# Patient Record
Sex: FEMALE | Race: WHITE | NOT HISPANIC OR LATINO
[De-identification: names, ages, dates, MRNs, and addresses within clinical notes are randomized per-mention and may not be internally consistent; named-entity substitution may affect disease eponyms.]

---

## 2017-05-02 ENCOUNTER — APPOINTMENT (OUTPATIENT)
Dept: ORTHOPEDIC SURGERY | Facility: CLINIC | Age: 57
End: 2017-05-02

## 2017-05-02 VITALS
DIASTOLIC BLOOD PRESSURE: 83 MMHG | HEART RATE: 96 BPM | BODY MASS INDEX: 31.5 KG/M2 | WEIGHT: 169 LBS | HEIGHT: 61.5 IN | SYSTOLIC BLOOD PRESSURE: 129 MMHG

## 2017-05-02 DIAGNOSIS — M16.12 UNILATERAL PRIMARY OSTEOARTHRITIS, LEFT HIP: ICD-10-CM

## 2017-05-08 ENCOUNTER — OUTPATIENT (OUTPATIENT)
Dept: OUTPATIENT SERVICES | Facility: HOSPITAL | Age: 57
LOS: 1 days | End: 2017-05-08
Payer: COMMERCIAL

## 2017-05-08 VITALS
WEIGHT: 173.5 LBS | SYSTOLIC BLOOD PRESSURE: 128 MMHG | TEMPERATURE: 98 F | OXYGEN SATURATION: 98 % | HEIGHT: 61.5 IN | RESPIRATION RATE: 15 BRPM | HEART RATE: 89 BPM | DIASTOLIC BLOOD PRESSURE: 91 MMHG

## 2017-05-08 DIAGNOSIS — G47.33 OBSTRUCTIVE SLEEP APNEA (ADULT) (PEDIATRIC): ICD-10-CM

## 2017-05-08 DIAGNOSIS — G56.01 CARPAL TUNNEL SYNDROME, RIGHT UPPER LIMB: Chronic | ICD-10-CM

## 2017-05-08 DIAGNOSIS — M16.12 UNILATERAL PRIMARY OSTEOARTHRITIS, LEFT HIP: ICD-10-CM

## 2017-05-08 DIAGNOSIS — Z01.818 ENCOUNTER FOR OTHER PREPROCEDURAL EXAMINATION: ICD-10-CM

## 2017-05-08 DIAGNOSIS — M19.90 UNSPECIFIED OSTEOARTHRITIS, UNSPECIFIED SITE: ICD-10-CM

## 2017-05-08 DIAGNOSIS — K60.3 ANAL FISTULA: Chronic | ICD-10-CM

## 2017-05-08 LAB
ANION GAP SERPL CALC-SCNC: 18 MMOL/L — HIGH (ref 5–17)
BLD GP AB SCN SERPL QL: NEGATIVE — SIGNIFICANT CHANGE UP
BUN SERPL-MCNC: 14 MG/DL — SIGNIFICANT CHANGE UP (ref 7–23)
CALCIUM SERPL-MCNC: 10.3 MG/DL — SIGNIFICANT CHANGE UP (ref 8.4–10.5)
CHLORIDE SERPL-SCNC: 99 MMOL/L — SIGNIFICANT CHANGE UP (ref 96–108)
CO2 SERPL-SCNC: 23 MMOL/L — SIGNIFICANT CHANGE UP (ref 22–31)
CREAT SERPL-MCNC: 0.8 MG/DL — SIGNIFICANT CHANGE UP (ref 0.5–1.3)
GLUCOSE SERPL-MCNC: 71 MG/DL — SIGNIFICANT CHANGE UP (ref 70–99)
HCT VFR BLD CALC: 41.7 % — SIGNIFICANT CHANGE UP (ref 34.5–45)
HGB BLD-MCNC: 14.8 G/DL — SIGNIFICANT CHANGE UP (ref 11.5–15.5)
MCHC RBC-ENTMCNC: 31.4 PG — SIGNIFICANT CHANGE UP (ref 27–34)
MCHC RBC-ENTMCNC: 35.5 GM/DL — SIGNIFICANT CHANGE UP (ref 32–36)
MCV RBC AUTO: 88.5 FL — SIGNIFICANT CHANGE UP (ref 80–100)
PLATELET # BLD AUTO: 275 K/UL — SIGNIFICANT CHANGE UP (ref 150–400)
POTASSIUM SERPL-MCNC: 4.4 MMOL/L — SIGNIFICANT CHANGE UP (ref 3.5–5.3)
POTASSIUM SERPL-SCNC: 4.4 MMOL/L — SIGNIFICANT CHANGE UP (ref 3.5–5.3)
RBC # BLD: 4.71 M/UL — SIGNIFICANT CHANGE UP (ref 3.8–5.2)
RBC # FLD: 12.4 % — SIGNIFICANT CHANGE UP (ref 10.3–14.5)
RH IG SCN BLD-IMP: POSITIVE — SIGNIFICANT CHANGE UP
SODIUM SERPL-SCNC: 140 MMOL/L — SIGNIFICANT CHANGE UP (ref 135–145)
WBC # BLD: 9.94 K/UL — SIGNIFICANT CHANGE UP (ref 3.8–10.5)
WBC # FLD AUTO: 9.94 K/UL — SIGNIFICANT CHANGE UP (ref 3.8–10.5)

## 2017-05-08 PROCEDURE — 87641 MR-STAPH DNA AMP PROBE: CPT

## 2017-05-08 PROCEDURE — 86900 BLOOD TYPING SEROLOGIC ABO: CPT

## 2017-05-08 PROCEDURE — 85027 COMPLETE CBC AUTOMATED: CPT

## 2017-05-08 PROCEDURE — 86901 BLOOD TYPING SEROLOGIC RH(D): CPT

## 2017-05-08 PROCEDURE — G0463: CPT

## 2017-05-08 PROCEDURE — 80048 BASIC METABOLIC PNL TOTAL CA: CPT

## 2017-05-08 PROCEDURE — 87640 STAPH A DNA AMP PROBE: CPT

## 2017-05-08 PROCEDURE — 86850 RBC ANTIBODY SCREEN: CPT

## 2017-05-08 RX ORDER — GABAPENTIN 400 MG/1
300 CAPSULE ORAL ONCE
Qty: 0 | Refills: 0 | Status: COMPLETED | OUTPATIENT
Start: 2017-05-15 | End: 2017-05-15

## 2017-05-08 RX ORDER — VANCOMYCIN HCL 1 G
1000 VIAL (EA) INTRAVENOUS ONCE
Qty: 0 | Refills: 0 | Status: DISCONTINUED | OUTPATIENT
Start: 2017-05-15 | End: 2017-05-16

## 2017-05-08 RX ORDER — ACETAMINOPHEN 500 MG
975 TABLET ORAL ONCE
Qty: 0 | Refills: 0 | Status: COMPLETED | OUTPATIENT
Start: 2017-05-15 | End: 2017-05-15

## 2017-05-08 NOTE — H&P PST ADULT - PSH
Carpal tunnel syndrome of right wrist  1996 Anal fistula  3/2017 - s/p fistula repair  Carpal tunnel syndrome of right wrist  1996

## 2017-05-08 NOTE — H&P PST ADULT - MUSCULOSKELETAL
details… detailed exam decreased ROM due to pain - left hip/normal strength/no calf tenderness/decreased ROM due to pain

## 2017-05-08 NOTE — H&P PST ADULT - NSANTHOSAYNRD_GEN_A_CORE
No. SANDY screening performed.  STOP BANG Legend: 0-2 = LOW Risk; 3-4 = INTERMEDIATE Risk; 5-8 = HIGH Risk

## 2017-05-08 NOTE — H&P PST ADULT - HISTORY OF PRESENT ILLNESS
57 yo female with h/o HTN, HLD, anxiety, depression and osteoarthrosis with c/o worsening left groin pain presents for left THR on 5/15/2017.

## 2017-05-08 NOTE — H&P PST ADULT - PROBLEM SELECTOR PLAN 1
Left THR  Preemptive analgesia preoperatively - pt states she will take own PPI (omeprazole) and tramadol at home prior to arriving at hospital on day of surgery.

## 2017-05-08 NOTE — H&P PST ADULT - PMH
Anxiety    Depression    Diverticulitis  2015  GERD (gastroesophageal reflux disease)    Hyperlipidemia    Hypertension    Osteoarthrosis Anal fistula  past - s/p fistulotomy  Anxiety    Depression    Diverticulitis  2015  GERD (gastroesophageal reflux disease)    Hyperlipidemia    Hypertension    Osteoarthrosis

## 2017-05-08 NOTE — H&P PST ADULT - DENTITION
denies dentures, right lower/normal denies dentures; right lower - slightly loose tooth, saw dentist recently, no further action required/normal

## 2017-05-09 LAB
MRSA PCR RESULT.: SIGNIFICANT CHANGE UP
S AUREUS DNA NOSE QL NAA+PROBE: DETECTED

## 2017-05-15 ENCOUNTER — TRANSCRIPTION ENCOUNTER (OUTPATIENT)
Age: 57
End: 2017-05-15

## 2017-05-15 ENCOUNTER — INPATIENT (INPATIENT)
Facility: HOSPITAL | Age: 57
LOS: 0 days | Discharge: HOME | DRG: 470 | End: 2017-05-16
Attending: ORTHOPAEDIC SURGERY | Admitting: ORTHOPAEDIC SURGERY
Payer: COMMERCIAL

## 2017-05-15 ENCOUNTER — RESULT REVIEW (OUTPATIENT)
Age: 57
End: 2017-05-15

## 2017-05-15 ENCOUNTER — APPOINTMENT (OUTPATIENT)
Dept: ORTHOPEDIC SURGERY | Facility: HOSPITAL | Age: 57
End: 2017-05-15

## 2017-05-15 VITALS
RESPIRATION RATE: 20 BRPM | WEIGHT: 169.98 LBS | DIASTOLIC BLOOD PRESSURE: 95 MMHG | OXYGEN SATURATION: 100 % | SYSTOLIC BLOOD PRESSURE: 136 MMHG | TEMPERATURE: 99 F | HEIGHT: 61.5 IN | HEART RATE: 94 BPM

## 2017-05-15 DIAGNOSIS — K60.3 ANAL FISTULA: Chronic | ICD-10-CM

## 2017-05-15 DIAGNOSIS — M16.12 UNILATERAL PRIMARY OSTEOARTHRITIS, LEFT HIP: ICD-10-CM

## 2017-05-15 DIAGNOSIS — G56.01 CARPAL TUNNEL SYNDROME, RIGHT UPPER LIMB: Chronic | ICD-10-CM

## 2017-05-15 LAB
BUN SERPL-MCNC: 13 MG/DL — SIGNIFICANT CHANGE UP (ref 7–23)
CALCIUM SERPL-MCNC: 8.6 MG/DL — SIGNIFICANT CHANGE UP (ref 8.4–10.5)
CHLORIDE SERPL-SCNC: 102 MMOL/L — SIGNIFICANT CHANGE UP (ref 96–108)
CO2 SERPL-SCNC: 26 MMOL/L — SIGNIFICANT CHANGE UP (ref 22–31)
CREAT SERPL-MCNC: 0.77 MG/DL — SIGNIFICANT CHANGE UP (ref 0.5–1.3)
GLUCOSE SERPL-MCNC: 146 MG/DL — HIGH (ref 70–99)
HCG UR QL: NEGATIVE — SIGNIFICANT CHANGE UP
HCT VFR BLD CALC: 36.9 % — SIGNIFICANT CHANGE UP (ref 34.5–45)
HGB BLD-MCNC: 12.9 G/DL — SIGNIFICANT CHANGE UP (ref 11.5–15.5)
MCHC RBC-ENTMCNC: 32.2 PG — SIGNIFICANT CHANGE UP (ref 27–34)
MCHC RBC-ENTMCNC: 34.9 GM/DL — SIGNIFICANT CHANGE UP (ref 32–36)
MCV RBC AUTO: 92.1 FL — SIGNIFICANT CHANGE UP (ref 80–100)
PLATELET # BLD AUTO: 267 K/UL — SIGNIFICANT CHANGE UP (ref 150–400)
POTASSIUM SERPL-MCNC: 4.4 MMOL/L — SIGNIFICANT CHANGE UP (ref 3.5–5.3)
POTASSIUM SERPL-SCNC: 4.4 MMOL/L — SIGNIFICANT CHANGE UP (ref 3.5–5.3)
RBC # BLD: 4.01 M/UL — SIGNIFICANT CHANGE UP (ref 3.8–5.2)
RBC # FLD: 11.1 % — SIGNIFICANT CHANGE UP (ref 10.3–14.5)
SODIUM SERPL-SCNC: 140 MMOL/L — SIGNIFICANT CHANGE UP (ref 135–145)
WBC # BLD: 15.8 K/UL — HIGH (ref 3.8–10.5)
WBC # FLD AUTO: 15.8 K/UL — HIGH (ref 3.8–10.5)

## 2017-05-15 PROCEDURE — 88305 TISSUE EXAM BY PATHOLOGIST: CPT | Mod: 26

## 2017-05-15 PROCEDURE — 88311 DECALCIFY TISSUE: CPT | Mod: 26

## 2017-05-15 PROCEDURE — 27130 TOTAL HIP ARTHROPLASTY: CPT | Mod: LT

## 2017-05-15 PROCEDURE — 73502 X-RAY EXAM HIP UNI 2-3 VIEWS: CPT | Mod: 26,LT

## 2017-05-15 RX ORDER — ALPRAZOLAM 0.25 MG
1 TABLET ORAL
Qty: 0 | Refills: 0 | COMMUNITY

## 2017-05-15 RX ORDER — ACETAMINOPHEN 500 MG
650 TABLET ORAL EVERY 6 HOURS
Qty: 0 | Refills: 0 | Status: DISCONTINUED | OUTPATIENT
Start: 2017-05-15 | End: 2017-05-16

## 2017-05-15 RX ORDER — ONDANSETRON 8 MG/1
4 TABLET, FILM COATED ORAL ONCE
Qty: 0 | Refills: 0 | Status: DISCONTINUED | OUTPATIENT
Start: 2017-05-15 | End: 2017-05-15

## 2017-05-15 RX ORDER — FLUOXETINE HCL 10 MG
1 CAPSULE ORAL
Qty: 0 | Refills: 0 | COMMUNITY

## 2017-05-15 RX ORDER — OXYCODONE HYDROCHLORIDE 5 MG/1
10 TABLET ORAL EVERY 4 HOURS
Qty: 0 | Refills: 0 | Status: DISCONTINUED | OUTPATIENT
Start: 2017-05-15 | End: 2017-05-16

## 2017-05-15 RX ORDER — ACETAMINOPHEN 500 MG
975 TABLET ORAL EVERY 6 HOURS
Qty: 0 | Refills: 0 | Status: DISCONTINUED | OUTPATIENT
Start: 2017-05-15 | End: 2017-05-16

## 2017-05-15 RX ORDER — DOCUSATE SODIUM 100 MG
100 CAPSULE ORAL THREE TIMES A DAY
Qty: 0 | Refills: 0 | Status: DISCONTINUED | OUTPATIENT
Start: 2017-05-15 | End: 2017-05-16

## 2017-05-15 RX ORDER — SODIUM CHLORIDE 9 MG/ML
1000 INJECTION INTRAMUSCULAR; INTRAVENOUS; SUBCUTANEOUS ONCE
Qty: 0 | Refills: 0 | Status: COMPLETED | OUTPATIENT
Start: 2017-05-16 | End: 2017-05-16

## 2017-05-15 RX ORDER — KETOROLAC TROMETHAMINE 30 MG/ML
15 SYRINGE (ML) INJECTION EVERY 8 HOURS
Qty: 0 | Refills: 0 | Status: DISCONTINUED | OUTPATIENT
Start: 2017-05-16 | End: 2017-05-16

## 2017-05-15 RX ORDER — ATORVASTATIN CALCIUM 80 MG/1
1 TABLET, FILM COATED ORAL
Qty: 0 | Refills: 0 | COMMUNITY

## 2017-05-15 RX ORDER — POLYETHYLENE GLYCOL 3350 17 G/17G
17 POWDER, FOR SOLUTION ORAL DAILY
Qty: 0 | Refills: 0 | Status: DISCONTINUED | OUTPATIENT
Start: 2017-05-15 | End: 2017-05-16

## 2017-05-15 RX ORDER — CEFAZOLIN SODIUM 1 G
2000 VIAL (EA) INJECTION EVERY 8 HOURS
Qty: 0 | Refills: 0 | Status: COMPLETED | OUTPATIENT
Start: 2017-05-16 | End: 2017-05-16

## 2017-05-15 RX ORDER — LOSARTAN POTASSIUM 100 MG/1
25 TABLET, FILM COATED ORAL DAILY
Qty: 0 | Refills: 0 | Status: DISCONTINUED | OUTPATIENT
Start: 2017-05-15 | End: 2017-05-16

## 2017-05-15 RX ORDER — SODIUM CHLORIDE 9 MG/ML
3 INJECTION INTRAMUSCULAR; INTRAVENOUS; SUBCUTANEOUS EVERY 8 HOURS
Qty: 0 | Refills: 0 | Status: DISCONTINUED | OUTPATIENT
Start: 2017-05-15 | End: 2017-05-15

## 2017-05-15 RX ORDER — OMEPRAZOLE 10 MG/1
1 CAPSULE, DELAYED RELEASE ORAL
Qty: 0 | Refills: 0 | COMMUNITY

## 2017-05-15 RX ORDER — GABAPENTIN 400 MG/1
100 CAPSULE ORAL EVERY 8 HOURS
Qty: 0 | Refills: 0 | Status: DISCONTINUED | OUTPATIENT
Start: 2017-05-15 | End: 2017-05-16

## 2017-05-15 RX ORDER — SODIUM CHLORIDE 9 MG/ML
1000 INJECTION INTRAMUSCULAR; INTRAVENOUS; SUBCUTANEOUS ONCE
Qty: 0 | Refills: 0 | Status: COMPLETED | OUTPATIENT
Start: 2017-05-15 | End: 2017-05-16

## 2017-05-15 RX ORDER — HYDROMORPHONE HYDROCHLORIDE 2 MG/ML
0.5 INJECTION INTRAMUSCULAR; INTRAVENOUS; SUBCUTANEOUS
Qty: 0 | Refills: 0 | Status: DISCONTINUED | OUTPATIENT
Start: 2017-05-15 | End: 2017-05-15

## 2017-05-15 RX ORDER — OXYCODONE HYDROCHLORIDE 5 MG/1
5 TABLET ORAL EVERY 4 HOURS
Qty: 0 | Refills: 0 | Status: DISCONTINUED | OUTPATIENT
Start: 2017-05-15 | End: 2017-05-16

## 2017-05-15 RX ORDER — MORPHINE SULFATE 50 MG/1
2 CAPSULE, EXTENDED RELEASE ORAL
Qty: 0 | Refills: 0 | Status: DISCONTINUED | OUTPATIENT
Start: 2017-05-15 | End: 2017-05-16

## 2017-05-15 RX ORDER — SODIUM CHLORIDE 9 MG/ML
1000 INJECTION, SOLUTION INTRAVENOUS
Qty: 0 | Refills: 0 | Status: DISCONTINUED | OUTPATIENT
Start: 2017-05-15 | End: 2017-05-16

## 2017-05-15 RX ORDER — LOSARTAN POTASSIUM 100 MG/1
1 TABLET, FILM COATED ORAL
Qty: 0 | Refills: 0 | COMMUNITY

## 2017-05-15 RX ORDER — PANTOPRAZOLE SODIUM 20 MG/1
40 TABLET, DELAYED RELEASE ORAL
Qty: 0 | Refills: 0 | Status: DISCONTINUED | OUTPATIENT
Start: 2017-05-15 | End: 2017-05-16

## 2017-05-15 RX ORDER — DIPHENHYDRAMINE HCL 50 MG
25 CAPSULE ORAL EVERY 8 HOURS
Qty: 0 | Refills: 0 | Status: DISCONTINUED | OUTPATIENT
Start: 2017-05-15 | End: 2017-05-16

## 2017-05-15 RX ORDER — MAGNESIUM HYDROXIDE 400 MG/1
30 TABLET, CHEWABLE ORAL DAILY
Qty: 0 | Refills: 0 | Status: DISCONTINUED | OUTPATIENT
Start: 2017-05-15 | End: 2017-05-16

## 2017-05-15 RX ORDER — ASPIRIN/CALCIUM CARB/MAGNESIUM 324 MG
325 TABLET ORAL
Qty: 0 | Refills: 0 | Status: DISCONTINUED | OUTPATIENT
Start: 2017-05-15 | End: 2017-05-16

## 2017-05-15 RX ORDER — ONDANSETRON 8 MG/1
4 TABLET, FILM COATED ORAL EVERY 6 HOURS
Qty: 0 | Refills: 0 | Status: DISCONTINUED | OUTPATIENT
Start: 2017-05-15 | End: 2017-05-16

## 2017-05-15 RX ORDER — ATORVASTATIN CALCIUM 80 MG/1
10 TABLET, FILM COATED ORAL AT BEDTIME
Qty: 0 | Refills: 0 | Status: DISCONTINUED | OUTPATIENT
Start: 2017-05-15 | End: 2017-05-16

## 2017-05-15 RX ORDER — FLUOXETINE HCL 10 MG
40 CAPSULE ORAL DAILY
Qty: 0 | Refills: 0 | Status: DISCONTINUED | OUTPATIENT
Start: 2017-05-15 | End: 2017-05-16

## 2017-05-15 RX ORDER — SENNA PLUS 8.6 MG/1
2 TABLET ORAL AT BEDTIME
Qty: 0 | Refills: 0 | Status: DISCONTINUED | OUTPATIENT
Start: 2017-05-15 | End: 2017-05-16

## 2017-05-15 RX ORDER — ALPRAZOLAM 0.25 MG
1 TABLET ORAL
Qty: 0 | Refills: 0 | Status: DISCONTINUED | OUTPATIENT
Start: 2017-05-15 | End: 2017-05-16

## 2017-05-15 RX ORDER — TRAMADOL HYDROCHLORIDE 50 MG/1
50 TABLET ORAL EVERY 8 HOURS
Qty: 0 | Refills: 0 | Status: DISCONTINUED | OUTPATIENT
Start: 2017-05-15 | End: 2017-05-16

## 2017-05-15 RX ADMIN — SODIUM CHLORIDE 3 MILLILITER(S): 9 INJECTION INTRAMUSCULAR; INTRAVENOUS; SUBCUTANEOUS at 12:34

## 2017-05-15 RX ADMIN — HYDROMORPHONE HYDROCHLORIDE 0.5 MILLIGRAM(S): 2 INJECTION INTRAMUSCULAR; INTRAVENOUS; SUBCUTANEOUS at 18:31

## 2017-05-15 RX ADMIN — Medication 975 MILLIGRAM(S): at 12:34

## 2017-05-15 RX ADMIN — GABAPENTIN 100 MILLIGRAM(S): 400 CAPSULE ORAL at 23:04

## 2017-05-15 RX ADMIN — Medication 1 MILLIGRAM(S): at 22:02

## 2017-05-15 RX ADMIN — HYDROMORPHONE HYDROCHLORIDE 0.5 MILLIGRAM(S): 2 INJECTION INTRAMUSCULAR; INTRAVENOUS; SUBCUTANEOUS at 16:45

## 2017-05-15 RX ADMIN — OXYCODONE HYDROCHLORIDE 10 MILLIGRAM(S): 5 TABLET ORAL at 19:45

## 2017-05-15 RX ADMIN — TRAMADOL HYDROCHLORIDE 50 MILLIGRAM(S): 50 TABLET ORAL at 23:34

## 2017-05-15 RX ADMIN — ATORVASTATIN CALCIUM 10 MILLIGRAM(S): 80 TABLET, FILM COATED ORAL at 21:55

## 2017-05-15 RX ADMIN — Medication 100 MILLIGRAM(S): at 21:56

## 2017-05-15 RX ADMIN — OXYCODONE HYDROCHLORIDE 10 MILLIGRAM(S): 5 TABLET ORAL at 20:05

## 2017-05-15 RX ADMIN — TRAMADOL HYDROCHLORIDE 50 MILLIGRAM(S): 50 TABLET ORAL at 23:04

## 2017-05-15 RX ADMIN — GABAPENTIN 300 MILLIGRAM(S): 400 CAPSULE ORAL at 12:34

## 2017-05-15 RX ADMIN — MORPHINE SULFATE 2 MILLIGRAM(S): 50 CAPSULE, EXTENDED RELEASE ORAL at 23:45

## 2017-05-15 RX ADMIN — HYDROMORPHONE HYDROCHLORIDE 0.5 MILLIGRAM(S): 2 INJECTION INTRAMUSCULAR; INTRAVENOUS; SUBCUTANEOUS at 16:57

## 2017-05-15 RX ADMIN — Medication 325 MILLIGRAM(S): at 18:30

## 2017-05-15 NOTE — PATIENT PROFILE ADULT. - PMH
Anal fistula  past - s/p fistulotomy  Anxiety    Depression    Diverticulitis  2015  GERD (gastroesophageal reflux disease)    Hyperlipidemia    Hypertension    Osteoarthrosis

## 2017-05-16 ENCOUNTER — TRANSCRIPTION ENCOUNTER (OUTPATIENT)
Age: 57
End: 2017-05-16

## 2017-05-16 VITALS
DIASTOLIC BLOOD PRESSURE: 82 MMHG | TEMPERATURE: 98 F | RESPIRATION RATE: 18 BRPM | OXYGEN SATURATION: 97 % | HEART RATE: 103 BPM | SYSTOLIC BLOOD PRESSURE: 132 MMHG

## 2017-05-16 LAB
ANION GAP SERPL CALC-SCNC: 16 MMOL/L — SIGNIFICANT CHANGE UP (ref 5–17)
BUN SERPL-MCNC: 9 MG/DL — SIGNIFICANT CHANGE UP (ref 7–23)
CALCIUM SERPL-MCNC: 9.3 MG/DL — SIGNIFICANT CHANGE UP (ref 8.4–10.5)
CHLORIDE SERPL-SCNC: 101 MMOL/L — SIGNIFICANT CHANGE UP (ref 96–108)
CO2 SERPL-SCNC: 24 MMOL/L — SIGNIFICANT CHANGE UP (ref 22–31)
CREAT SERPL-MCNC: 0.66 MG/DL — SIGNIFICANT CHANGE UP (ref 0.5–1.3)
GLUCOSE SERPL-MCNC: 115 MG/DL — HIGH (ref 70–99)
HCT VFR BLD CALC: 33.4 % — LOW (ref 34.5–45)
HGB BLD-MCNC: 11.7 G/DL — SIGNIFICANT CHANGE UP (ref 11.5–15.5)
MCHC RBC-ENTMCNC: 32.2 PG — SIGNIFICANT CHANGE UP (ref 27–34)
MCHC RBC-ENTMCNC: 34.9 GM/DL — SIGNIFICANT CHANGE UP (ref 32–36)
MCV RBC AUTO: 92.1 FL — SIGNIFICANT CHANGE UP (ref 80–100)
PLATELET # BLD AUTO: 264 K/UL — SIGNIFICANT CHANGE UP (ref 150–400)
POTASSIUM SERPL-MCNC: 4.7 MMOL/L — SIGNIFICANT CHANGE UP (ref 3.5–5.3)
POTASSIUM SERPL-SCNC: 4.7 MMOL/L — SIGNIFICANT CHANGE UP (ref 3.5–5.3)
RBC # BLD: 3.63 M/UL — LOW (ref 3.8–5.2)
RBC # FLD: 11.3 % — SIGNIFICANT CHANGE UP (ref 10.3–14.5)
SODIUM SERPL-SCNC: 141 MMOL/L — SIGNIFICANT CHANGE UP (ref 135–145)
WBC # BLD: 15.7 K/UL — HIGH (ref 3.8–10.5)
WBC # FLD AUTO: 15.7 K/UL — HIGH (ref 3.8–10.5)

## 2017-05-16 PROCEDURE — C1889: CPT

## 2017-05-16 PROCEDURE — 97116 GAIT TRAINING THERAPY: CPT

## 2017-05-16 PROCEDURE — 88305 TISSUE EXAM BY PATHOLOGIST: CPT

## 2017-05-16 PROCEDURE — 97161 PT EVAL LOW COMPLEX 20 MIN: CPT

## 2017-05-16 PROCEDURE — 72170 X-RAY EXAM OF PELVIS: CPT

## 2017-05-16 PROCEDURE — 88311 DECALCIFY TISSUE: CPT

## 2017-05-16 PROCEDURE — 85027 COMPLETE CBC AUTOMATED: CPT

## 2017-05-16 PROCEDURE — 97165 OT EVAL LOW COMPLEX 30 MIN: CPT

## 2017-05-16 PROCEDURE — C1776: CPT

## 2017-05-16 PROCEDURE — 81025 URINE PREGNANCY TEST: CPT

## 2017-05-16 PROCEDURE — 80048 BASIC METABOLIC PNL TOTAL CA: CPT

## 2017-05-16 PROCEDURE — C1713: CPT

## 2017-05-16 PROCEDURE — 73501 X-RAY EXAM HIP UNI 1 VIEW: CPT

## 2017-05-16 RX ORDER — LACTOBACILLUS ACIDOPHILUS 100MM CELL
1 CAPSULE ORAL
Qty: 0 | Refills: 0 | COMMUNITY

## 2017-05-16 RX ORDER — VITAMIN E 100 UNIT
1 CAPSULE ORAL
Qty: 0 | Refills: 0 | COMMUNITY

## 2017-05-16 RX ORDER — DICLOFENAC SODIUM 75 MG/1
50 TABLET, DELAYED RELEASE ORAL
Qty: 0 | Refills: 0 | Status: DISCONTINUED | OUTPATIENT
Start: 2017-05-16 | End: 2017-05-16

## 2017-05-16 RX ORDER — DIPHENHYDRAMINE HCL 50 MG
1 CAPSULE ORAL
Qty: 0 | Refills: 0 | COMMUNITY

## 2017-05-16 RX ORDER — SENNA PLUS 8.6 MG/1
2 TABLET ORAL
Qty: 0 | Refills: 0 | COMMUNITY
Start: 2017-05-16

## 2017-05-16 RX ORDER — CHOLECALCIFEROL (VITAMIN D3) 125 MCG
1 CAPSULE ORAL
Qty: 0 | Refills: 0 | COMMUNITY

## 2017-05-16 RX ORDER — ACETAMINOPHEN 500 MG
1000 TABLET ORAL ONCE
Qty: 0 | Refills: 0 | Status: COMPLETED | OUTPATIENT
Start: 2017-05-16 | End: 2017-05-16

## 2017-05-16 RX ORDER — TRAMADOL HYDROCHLORIDE 50 MG/1
1 TABLET ORAL
Qty: 21 | Refills: 0 | OUTPATIENT
Start: 2017-05-16 | End: 2017-05-23

## 2017-05-16 RX ORDER — ACETAMINOPHEN 500 MG
2 TABLET ORAL
Qty: 0 | Refills: 0 | COMMUNITY
Start: 2017-05-16

## 2017-05-16 RX ORDER — DOCUSATE SODIUM 100 MG
1 CAPSULE ORAL
Qty: 0 | Refills: 0 | COMMUNITY
Start: 2017-05-16

## 2017-05-16 RX ORDER — OXYCODONE HYDROCHLORIDE 5 MG/1
1 TABLET ORAL
Qty: 55 | Refills: 0 | OUTPATIENT
Start: 2017-05-16

## 2017-05-16 RX ORDER — TRAMADOL HYDROCHLORIDE 50 MG/1
1 TABLET ORAL
Qty: 0 | Refills: 0 | COMMUNITY

## 2017-05-16 RX ORDER — CALCIUM CARBONATE 500(1250)
1 TABLET ORAL
Qty: 0 | Refills: 0 | COMMUNITY

## 2017-05-16 RX ORDER — OMEGA-3 ACID ETHYL ESTERS 1 G
1 CAPSULE ORAL
Qty: 0 | Refills: 0 | COMMUNITY

## 2017-05-16 RX ORDER — DICLOFENAC SODIUM 75 MG/1
1 TABLET, DELAYED RELEASE ORAL
Qty: 0 | Refills: 0 | COMMUNITY

## 2017-05-16 RX ORDER — POLYETHYLENE GLYCOL 3350 17 G/17G
17 POWDER, FOR SOLUTION ORAL
Qty: 0 | Refills: 0 | COMMUNITY
Start: 2017-05-16

## 2017-05-16 RX ORDER — ASPIRIN/CALCIUM CARB/MAGNESIUM 324 MG
1 TABLET ORAL
Qty: 0 | Refills: 0 | COMMUNITY
Start: 2017-05-16

## 2017-05-16 RX ORDER — L.ACIDOPH/B.ANIMALIS/B.LONGUM 15B CELL
1 CAPSULE ORAL
Qty: 0 | Refills: 0 | COMMUNITY

## 2017-05-16 RX ADMIN — OXYCODONE HYDROCHLORIDE 10 MILLIGRAM(S): 5 TABLET ORAL at 12:21

## 2017-05-16 RX ADMIN — Medication 1 TABLET(S): at 12:21

## 2017-05-16 RX ADMIN — Medication 40 MILLIGRAM(S): at 12:21

## 2017-05-16 RX ADMIN — SODIUM CHLORIDE 1000 MILLILITER(S): 9 INJECTION INTRAMUSCULAR; INTRAVENOUS; SUBCUTANEOUS at 06:46

## 2017-05-16 RX ADMIN — MORPHINE SULFATE 2 MILLIGRAM(S): 50 CAPSULE, EXTENDED RELEASE ORAL at 04:50

## 2017-05-16 RX ADMIN — OXYCODONE HYDROCHLORIDE 10 MILLIGRAM(S): 5 TABLET ORAL at 01:28

## 2017-05-16 RX ADMIN — PANTOPRAZOLE SODIUM 40 MILLIGRAM(S): 20 TABLET, DELAYED RELEASE ORAL at 06:18

## 2017-05-16 RX ADMIN — MORPHINE SULFATE 2 MILLIGRAM(S): 50 CAPSULE, EXTENDED RELEASE ORAL at 00:15

## 2017-05-16 RX ADMIN — LOSARTAN POTASSIUM 25 MILLIGRAM(S): 100 TABLET, FILM COATED ORAL at 06:18

## 2017-05-16 RX ADMIN — Medication 1000 MILLIGRAM(S): at 03:32

## 2017-05-16 RX ADMIN — Medication 100 MILLIGRAM(S): at 08:12

## 2017-05-16 RX ADMIN — OXYCODONE HYDROCHLORIDE 10 MILLIGRAM(S): 5 TABLET ORAL at 06:49

## 2017-05-16 RX ADMIN — Medication 325 MILLIGRAM(S): at 06:18

## 2017-05-16 RX ADMIN — DICLOFENAC SODIUM 50 MILLIGRAM(S): 75 TABLET, DELAYED RELEASE ORAL at 03:01

## 2017-05-16 RX ADMIN — Medication 400 MILLIGRAM(S): at 03:02

## 2017-05-16 RX ADMIN — Medication 100 MILLIGRAM(S): at 02:28

## 2017-05-16 RX ADMIN — DICLOFENAC SODIUM 50 MILLIGRAM(S): 75 TABLET, DELAYED RELEASE ORAL at 03:31

## 2017-05-16 RX ADMIN — TRAMADOL HYDROCHLORIDE 50 MILLIGRAM(S): 50 TABLET ORAL at 16:14

## 2017-05-16 RX ADMIN — Medication 100 MILLIGRAM(S): at 06:20

## 2017-05-16 RX ADMIN — OXYCODONE HYDROCHLORIDE 10 MILLIGRAM(S): 5 TABLET ORAL at 06:19

## 2017-05-16 RX ADMIN — OXYCODONE HYDROCHLORIDE 10 MILLIGRAM(S): 5 TABLET ORAL at 10:34

## 2017-05-16 RX ADMIN — TRAMADOL HYDROCHLORIDE 50 MILLIGRAM(S): 50 TABLET ORAL at 12:28

## 2017-05-16 RX ADMIN — SODIUM CHLORIDE 1000 MILLILITER(S): 9 INJECTION INTRAMUSCULAR; INTRAVENOUS; SUBCUTANEOUS at 00:46

## 2017-05-16 RX ADMIN — GABAPENTIN 100 MILLIGRAM(S): 400 CAPSULE ORAL at 06:21

## 2017-05-16 RX ADMIN — MORPHINE SULFATE 2 MILLIGRAM(S): 50 CAPSULE, EXTENDED RELEASE ORAL at 04:20

## 2017-05-16 RX ADMIN — OXYCODONE HYDROCHLORIDE 10 MILLIGRAM(S): 5 TABLET ORAL at 00:58

## 2017-05-16 NOTE — OCCUPATIONAL THERAPY INITIAL EVALUATION ADULT - ANTICIPATED DISCHARGE DISPOSITION, OT EVAL
Home OT to address deficits, assess home safety, increase independence in ADLs and  functional mobility.

## 2017-05-16 NOTE — DISCHARGE NOTE ADULT - CARE PROVIDER_API CALL
Rajendra Malone), Orthopaedic Surgery  [address] [place] [address]  Phone: [phone]  Fax: [phone]

## 2017-05-16 NOTE — PHYSICAL THERAPY INITIAL EVALUATION ADULT - PERTINENT HX OF CURRENT PROBLEM, REHAB EVAL
Patient is a 56 yr old female with h/o HTN, HLD, anxiety, depression and osteoarthrosis who presents with c/o worsening left groin pain. Patient is now s/p above procedure on 5/15.

## 2017-05-16 NOTE — PHYSICAL THERAPY INITIAL EVALUATION ADULT - ACTIVE RANGE OF MOTION EXAMINATION, REHAB EVAL
L hip limited by pain, L knee/ankle WFL/Right LE Active ROM was WFL (within functional limits)/Right UE Active ROM was WFL (within functional limits)/Left UE Active ROM was WFL (within functional limits)

## 2017-05-16 NOTE — DISCHARGE NOTE ADULT - MEDICATION SUMMARY - MEDICATIONS TO TAKE
I will START or STAY ON the medications listed below when I get home from the hospital:    acetaminophen 325 mg oral tablet  -- 2 tab(s) by mouth every 6 hours, As needed, For Temp over 38.3 C (100.94 F)  -- Indication: For Pain / fever    oxyCODONE 5 mg oral tablet  -- 1-2 tab(s) by mouth every 6 hours, As needed, MDD:6  -- Indication: For severe pain    traMADol 50 mg oral tablet  -- 1 tab(s) by mouth every 8 hours MDD:3  -- Indication: For Pain    aspirin 325 mg oral delayed release tablet  -- 1 tab(s) by mouth 2 times a day x 6 WEEKS Total (blood thinner) then STOP   -- Indication: For Blood thinner    losartan 25 mg oral tablet  -- 1 tab(s) by mouth once a day  -- Indication: For Hypertension    FLUoxetine 40 mg oral capsule  -- 1 cap(s) by mouth once a day  -- Indication: For Anxiety    atorvastatin 10 mg oral tablet  -- 1 tab(s) by mouth once a day  -- Indication: For Hyperlipidemia    ALPRAZolam 1 mg oral tablet  -- 1 tab(s) by mouth 2 times a day, As Needed  -- Indication: For Anxiety    senna oral tablet  -- 2 tab(s) by mouth once a day (at bedtime), As needed, Constipation  -- Indication: For laxative    polyethylene glycol 3350 oral powder for reconstitution  -- 17 gram(s) by mouth once a day  -- while on pain medications   -- Indication: For laxative    docusate sodium 100 mg oral capsule  -- 1 cap(s) by mouth 3 times a day  -- while on pain medications   -- Indication: For stool softener    omeprazole 40 mg oral delayed release capsule  -- 1 cap(s) by mouth once a day  -- Indication: For reflux    Multiple Vitamins oral tablet  -- 1 tab(s) by mouth once a day  -- Indication: For supplement

## 2017-05-16 NOTE — DISCHARGE NOTE ADULT - HOSPITAL COURSE
Chief Complaint/Reason for Admission	"I am having a left hip replacement."	    History of Present Illness:  History of Present Illness		  57 yo female with h/o HTN, HLD, anxiety, depression and osteoarthrosis with c/o worsening left groin pain presents for left THR on 5/15/2017.    Allergies/Medications:   Allergies:        Intolerances:  	amoxicillin: Drug, Other, C.Diff    Home Medications:   * Patient Currently Takes Medications as of 08-May-2017 14:06 documented in Prescription Writer  · 	losartan 25 mg oral tablet: Last Dose Taken:  , 1 tab(s) orally once a day  · 	atorvastatin 10 mg oral tablet: Last Dose Taken:  , 1 tab(s) orally once a day  · 	FLUoxetine 40 mg oral capsule: Last Dose Taken:  , 1 cap(s) orally once a day  · 	traMADol 50 mg oral tablet: Last Dose Taken:  , 1 tab(s) orally 1 to 2 times a day, As Needed  · 	diclofenac sodium 75 mg oral delayed release tablet: Last Dose Taken:  , 1 tab(s) orally 2 times a day  · 	ALPRAZolam 1 mg oral tablet: Last Dose Taken:  , 1 tab(s) orally 2 times a day, As Needed  · 	Fish Oil 1000 mg oral capsule: Last Dose Taken:  , 1 cap(s) orally 2 times a day  · 	calcium (as carbonate) 600 mg oral tablet: Last Dose Taken:  , 1 tab(s) orally 2 times a day  · 	Probiotic Formula oral capsule: Last Dose Taken:  , 1 cap(s) orally once a day  · 	Acidophilus oral capsule: Last Dose Taken:  , 1 cap(s) orally once a day  · 	magnesium: Last Dose Taken:  , 400 milligram(s) orally once a day  · 	vitamin E 400 intl units oral capsule: Last Dose Taken:  , 1 cap(s) orally once a day  · 	Vitamin D3 1000 intl units oral capsule: Last Dose Taken:  , 1 cap(s) orally once a day  · 	remifemin: Last Dose Taken:  , 1 tab(s) orally 2 times a day  · 	diphenhydrAMINE 25 mg oral tablet: Last Dose Taken:  , 1 tab(s) orally 3 times a day, As Needed  · 	histamine dihydrochloride: Last Dose Taken:  , Apply topically to affected area , As Needed 1-3x daily  · 	omeprazole 40 mg oral delayed release capsule: Last Dose Taken:  , 1 cap(s) orally once a day    PMH/PSH/FH/SH:   Past Medical History:  Anal fistula  past - s/p fistulotomy  Anxiety    Depression    Diverticulitis  2015  GERD (gastroesophageal reflux disease)    Hyperlipidemia    Hypertension    Osteoarthrosis.    Past Surgical History:  Anal fistula  3/2017 - s/p fistula repair  Carpal tunnel syndrome of right wrist  1996.    Hospital Course:   5/15:  Pt underwent L JERRICA w/ Dr Malone. No complications noted  5/16:  Pt seen by PT and cleared for d/c -> home    Follow up Dr Malone in office

## 2017-05-16 NOTE — DISCHARGE NOTE ADULT - PATIENT PORTAL LINK FT
“You can access the Follow[facility] Patient Portal, offered by [facility], by registering with the following website: [email or web address]”

## 2017-05-16 NOTE — DISCHARGE NOTE ADULT - CARE PLAN
Principal Discharge DX:	Primary osteoarthritis of left hip  Goal:	improved ambulation and pain control  Instructions for follow-up, activity and diet:	Weight bearing as tolerated  Posterior Precautions   Please drop and dangle leg Q8hr for 45 min at a time, ankle pumps, quad sets, gluteal clenches and leg lifts   see discharge instructions

## 2017-05-16 NOTE — DISCHARGE NOTE ADULT - PLAN OF CARE
improved ambulation and pain control Weight bearing as tolerated  Posterior Precautions   Please drop and dangle leg Q8hr for 45 min at a time, ankle pumps, quad sets, gluteal clenches and leg lifts   see discharge instructions

## 2017-05-16 NOTE — OCCUPATIONAL THERAPY INITIAL EVALUATION ADULT - LIVES WITH, PROFILE
Pt lives in house with family with 6 steps to enter +12 steps inside, has walk in shower and tub/spouse

## 2017-05-16 NOTE — DISCHARGE NOTE ADULT - ADDITIONAL INSTRUCTIONS
Keep Aquacel dressing clean and dry   ice to affected incision every 4-6 hours x 72 hours   Continue ECOTRIN 325 mg by mouth 2x / day (at breakfast and at dinner) for a total of 6WEEKS   Follow up with Dr Malone in 10-14 days for dressing REMOVAL, wound check, and staple/suture removal (if applicable)   Please call for an appointment   Follow up with your private internist / PMD in 4-6 weeks re: general checkup (and possible medication adjustment)

## 2017-05-16 NOTE — DISCHARGE NOTE ADULT - NS AS ACTIVITY OBS
Walking-Indoors allowed/No Heavy lifting/straining/May shower; protect Aquacel dressing with water-tight seal  i.e. saran wrap; pat dry/Do not drive or operate machinery/Stairs allowed/Walking-Outdoors allowed

## 2017-05-24 ENCOUNTER — MEDICATION RENEWAL (OUTPATIENT)
Age: 57
End: 2017-05-24

## 2017-05-30 ENCOUNTER — APPOINTMENT (OUTPATIENT)
Dept: ORTHOPEDIC SURGERY | Facility: CLINIC | Age: 57
End: 2017-05-30

## 2017-05-30 RX ORDER — OXYCODONE AND ACETAMINOPHEN 5; 325 MG/1; MG/1
5-325 TABLET ORAL
Qty: 60 | Refills: 0 | Status: ACTIVE | COMMUNITY
Start: 2017-05-30 | End: 1900-01-01

## 2017-06-14 ENCOUNTER — RX RENEWAL (OUTPATIENT)
Age: 57
End: 2017-06-14

## 2017-06-14 RX ORDER — OXYCODONE 5 MG/1
5 TABLET ORAL
Qty: 30 | Refills: 0 | Status: ACTIVE | COMMUNITY
Start: 2017-05-24 | End: 1900-01-01

## 2017-07-12 ENCOUNTER — APPOINTMENT (OUTPATIENT)
Dept: ORTHOPEDIC SURGERY | Facility: CLINIC | Age: 57
End: 2017-07-12

## 2017-07-17 ENCOUNTER — MEDICATION RENEWAL (OUTPATIENT)
Age: 57
End: 2017-07-17

## 2017-07-17 RX ORDER — OXYCODONE 5 MG/1
5 TABLET ORAL
Qty: 90 | Refills: 0 | Status: ACTIVE | COMMUNITY
Start: 2017-07-17 | End: 1900-01-01

## 2017-10-25 ENCOUNTER — APPOINTMENT (OUTPATIENT)
Dept: ORTHOPEDIC SURGERY | Facility: CLINIC | Age: 57
End: 2017-10-25
Payer: COMMERCIAL

## 2017-10-25 VITALS — DIASTOLIC BLOOD PRESSURE: 87 MMHG | SYSTOLIC BLOOD PRESSURE: 134 MMHG | HEART RATE: 102 BPM

## 2017-10-25 VITALS — BODY MASS INDEX: 35.73 KG/M2 | WEIGHT: 182 LBS | HEIGHT: 60 IN

## 2017-10-25 DIAGNOSIS — Z96.642 PRESENCE OF LEFT ARTIFICIAL HIP JOINT: ICD-10-CM

## 2017-10-25 PROCEDURE — 73502 X-RAY EXAM HIP UNI 2-3 VIEWS: CPT | Mod: LT

## 2017-10-25 PROCEDURE — 99213 OFFICE O/P EST LOW 20 MIN: CPT

## 2018-04-25 ENCOUNTER — APPOINTMENT (OUTPATIENT)
Dept: ORTHOPEDIC SURGERY | Facility: CLINIC | Age: 58
End: 2018-04-25

## 2018-05-02 ENCOUNTER — APPOINTMENT (OUTPATIENT)
Dept: ORTHOPEDIC SURGERY | Facility: CLINIC | Age: 58
End: 2018-05-02

## 2018-06-14 ENCOUNTER — APPOINTMENT (OUTPATIENT)
Dept: OBGYN | Facility: CLINIC | Age: 58
End: 2018-06-14
Payer: COMMERCIAL

## 2018-06-14 VITALS — WEIGHT: 180 LBS | BODY MASS INDEX: 35.34 KG/M2 | HEIGHT: 60 IN

## 2018-06-14 PROCEDURE — 99396 PREV VISIT EST AGE 40-64: CPT

## 2018-06-18 LAB — HPV HIGH+LOW RISK DNA PNL CVX: NOT DETECTED

## 2018-06-22 LAB — CYTOLOGY CVX/VAG DOC THIN PREP: NORMAL

## 2018-06-27 ENCOUNTER — APPOINTMENT (OUTPATIENT)
Dept: ORTHOPEDIC SURGERY | Facility: CLINIC | Age: 58
End: 2018-06-27
Payer: COMMERCIAL

## 2018-06-27 PROCEDURE — 99213 OFFICE O/P EST LOW 20 MIN: CPT | Mod: 25

## 2018-06-27 PROCEDURE — 20610 DRAIN/INJ JOINT/BURSA W/O US: CPT | Mod: LT

## 2018-06-27 PROCEDURE — 73502 X-RAY EXAM HIP UNI 2-3 VIEWS: CPT | Mod: LT

## 2018-07-09 ENCOUNTER — OTHER (OUTPATIENT)
Age: 58
End: 2018-07-09

## 2018-07-10 ENCOUNTER — APPOINTMENT (OUTPATIENT)
Dept: ORTHOPEDIC SURGERY | Facility: CLINIC | Age: 58
End: 2018-07-10
Payer: COMMERCIAL

## 2018-07-10 DIAGNOSIS — M70.72 OTHER BURSITIS OF HIP, LEFT HIP: ICD-10-CM

## 2018-07-10 PROCEDURE — 99213 OFFICE O/P EST LOW 20 MIN: CPT

## 2018-07-10 RX ORDER — PREDNISOLONE ACETATE 10 MG/ML
1 SUSPENSION/ DROPS OPHTHALMIC
Qty: 5 | Refills: 0 | Status: ACTIVE | COMMUNITY
Start: 2018-02-15

## 2018-07-10 RX ORDER — ATORVASTATIN CALCIUM 10 MG/1
10 TABLET, FILM COATED ORAL
Qty: 90 | Refills: 0 | Status: ACTIVE | COMMUNITY
Start: 2018-05-09

## 2018-07-10 RX ORDER — ALPRAZOLAM 1 MG/1
1 TABLET ORAL
Qty: 60 | Refills: 0 | Status: ACTIVE | COMMUNITY
Start: 2018-06-16

## 2018-07-10 RX ORDER — TRAZODONE HYDROCHLORIDE 150 MG/1
150 TABLET ORAL
Qty: 14 | Refills: 0 | Status: ACTIVE | COMMUNITY
Start: 2018-06-16

## 2018-07-10 RX ORDER — OMEPRAZOLE 40 MG/1
40 CAPSULE, DELAYED RELEASE ORAL
Qty: 90 | Refills: 0 | Status: ACTIVE | COMMUNITY
Start: 2018-03-27

## 2018-07-11 ENCOUNTER — OTHER (OUTPATIENT)
Age: 58
End: 2018-07-11

## 2018-07-26 PROBLEM — M16.12 PRIMARY LOCALIZED OSTEOARTHROSIS OF PELVIC REGION, LEFT: Status: ACTIVE | Noted: 2017-05-02

## 2019-07-02 ENCOUNTER — APPOINTMENT (OUTPATIENT)
Dept: ORTHOPEDIC SURGERY | Facility: CLINIC | Age: 59
End: 2019-07-02
Payer: COMMERCIAL

## 2019-07-02 DIAGNOSIS — M70.71 OTHER BURSITIS OF HIP, RIGHT HIP: ICD-10-CM

## 2019-07-02 PROCEDURE — 73522 X-RAY EXAM HIPS BI 3-4 VIEWS: CPT

## 2019-07-02 PROCEDURE — 99213 OFFICE O/P EST LOW 20 MIN: CPT

## 2019-07-02 RX ORDER — CELECOXIB 200 MG/1
200 CAPSULE ORAL DAILY
Qty: 60 | Refills: 2 | Status: ACTIVE | COMMUNITY
Start: 2019-07-02 | End: 1900-01-01

## 2019-07-02 NOTE — HISTORY OF PRESENT ILLNESS
[de-identified] : Pt is a 59 y/o female s/p L THR 5/15/17. She is here today for her 2 year follow up. She still has some pain located around her GT bursa of her left hip. She states that she has an ache in the lateral portion of her hip after walking for one block. She has pain when she lays on her left side in bed. She received a cortisone injection to the GT bursa of the left hip in June 2018 which did not help. Patient now has right hip pain from what she believes to be "compensating for her left hip".

## 2019-07-02 NOTE — PHYSICAL EXAM
[de-identified] : No limp when she walks.  She is doing extremely well with both hips her motion is excellent and she has symmetric motion in her hips.  It has not changed since previously.  She has flexion to 135°, abduction of 80°, adduction 40°, external rotation 75° and internal rotation of 20°. Her pain is directly over her greater trochanter.  Both hips still give her some tenderness over the greater trochanter consistent with trochanteric bursitis however local injections did not seem to cause much improvement.  She was trochanteric bursitis of her left hip. [de-identified] : An AP of the pelvis and a lateral of the right and left hips show a left Biomet  all porous bone ingrowth E - Poly THR in good position and well fixed.  Her right hip femoral head is round joint space maintained.  There is no calcium over the greater trochanters but this does not rule out trochanteric bursitis.

## 2019-11-07 NOTE — PROVIDER CONTACT NOTE (OTHER) - ASSESSMENT
Continue making lifestyle changes that focus on good nutrition, regular exercise and stress management. Medication Plan: Continue current medication regimen.     Agreed upon goal/s before next office visit include: Great work being mindful about food cho series [email or web address]      Apps for on the [employer]  · [employer] - on the go group exercise  · [employer] 7 Minute Workout - free on the go HIIT training  · 5 Minute Kitchen Workout [email or web address]    Nutrition Trackers and To Heart ranges 112-119, pt in pain, anxious, and had surgery

## 2020-01-15 PROBLEM — F41.9 ANXIETY DISORDER, UNSPECIFIED: Chronic | Status: ACTIVE | Noted: 2017-05-08

## 2020-01-15 PROBLEM — K60.3 ANAL FISTULA: Chronic | Status: ACTIVE | Noted: 2017-05-08

## 2020-01-15 PROBLEM — M19.90 UNSPECIFIED OSTEOARTHRITIS, UNSPECIFIED SITE: Chronic | Status: ACTIVE | Noted: 2017-05-08

## 2020-01-15 PROBLEM — F32.9 MAJOR DEPRESSIVE DISORDER, SINGLE EPISODE, UNSPECIFIED: Chronic | Status: ACTIVE | Noted: 2017-05-08

## 2020-01-15 PROBLEM — K57.92 DIVERTICULITIS OF INTESTINE, PART UNSPECIFIED, WITHOUT PERFORATION OR ABSCESS WITHOUT BLEEDING: Chronic | Status: ACTIVE | Noted: 2017-05-08

## 2020-01-15 PROBLEM — K21.9 GASTRO-ESOPHAGEAL REFLUX DISEASE WITHOUT ESOPHAGITIS: Chronic | Status: ACTIVE | Noted: 2017-05-08

## 2020-01-15 PROBLEM — I10 ESSENTIAL (PRIMARY) HYPERTENSION: Chronic | Status: ACTIVE | Noted: 2017-05-08

## 2020-01-15 PROBLEM — E78.5 HYPERLIPIDEMIA, UNSPECIFIED: Chronic | Status: ACTIVE | Noted: 2017-05-08

## 2020-02-28 ENCOUNTER — APPOINTMENT (OUTPATIENT)
Dept: ORTHOPEDIC SURGERY | Facility: CLINIC | Age: 60
End: 2020-02-28
Payer: COMMERCIAL

## 2020-02-28 DIAGNOSIS — M70.72 OTHER BURSITIS OF HIP, LEFT HIP: ICD-10-CM

## 2020-02-28 DIAGNOSIS — Z96.642 PRESENCE OF LEFT ARTIFICIAL HIP JOINT: ICD-10-CM

## 2020-02-28 PROCEDURE — 20610 DRAIN/INJ JOINT/BURSA W/O US: CPT | Mod: 50

## 2020-02-28 PROCEDURE — 99213 OFFICE O/P EST LOW 20 MIN: CPT | Mod: 25

## 2020-02-28 NOTE — HISTORY OF PRESENT ILLNESS
[de-identified] : Pt is a 57 y/o female s/p L THR 5/15/17. \par She still has some pain located around her GT bursa of her left hip. \par She states that she has an ache in the lateral portion of her hip after walking for one block. \par She has pain when she lays on her left side in bed. \par She received a cortisone injection to the GT bursa of the left hip in June 2018 which did not help, but she is open to having this done again.\par Patient now has right hip pain also on the lateral aspect from what she believes to be "compensating for her left hip".

## 2020-02-28 NOTE — DISCUSSION/SUMMARY
[de-identified] : Tolerated the injections very well return visit in 1 year as far as her total hip replacement however sooner if the trochanteric bursa returns then 3 to 6 months.

## 2020-02-28 NOTE — PHYSICAL EXAM
[de-identified] : Is doing well as far as her total hip replacement she walks well.  Her motion is excellent.  She has symmetric motion in her hips..  She has flexion to 135°, abduction of 80°, adduction 40°, external rotation 75° and internal rotation of 20°. Her pain is directly over her greater trochanter.  Is bilateral trochanteric bursitis and at this time would like to try a local injections. \par \par

## 2020-02-28 NOTE — PROCEDURE
[de-identified] : Procedure Note: \par \par Anatomic Location: right  hip trochanteric bursitis\par \par Diagnosis:  hip trochanteric bursitis\par \par Procedure:  Injection of 6ccs of Marcaine 0.5% plain and Depo-Medrol 1cc (40 MG)\par \par Local Spray: Ethyl Chloride.\par \par Skin preparation with alcohol.\par \par Patient has consented for the procedure.\par \par Injection 22-gauge spinal needle  through an anterior  lateral approach.\par \par Patient tolerated the procedure well.\par \par \par Procedure Note: \par \par Anatomic Location: left  hip trochanteric bursitis\par \par Diagnosis:  hip trochanteric bursitis\par \par Procedure:  Injection of 6ccs of Marcaine 0.5% plain and Depo-Medrol 1cc (40 MG)\par \par Local Spray: Ethyl Chloride.\par \par Skin preparation with alcohol.\par \par Patient has consented for the procedure.\par \par Injection 22-gauge spinal needle  through an anterior  lateral approach.\par \par Patient tolerated the procedure well.\par \par Patient instructed to call the office if any reaction, fever, chills, increased erythema or swelling.   [phone].

## 2020-03-04 ENCOUNTER — APPOINTMENT (OUTPATIENT)
Dept: OBGYN | Facility: CLINIC | Age: 60
End: 2020-03-04
Payer: COMMERCIAL

## 2020-03-04 VITALS
BODY MASS INDEX: 31.91 KG/M2 | HEIGHT: 61 IN | WEIGHT: 169 LBS | SYSTOLIC BLOOD PRESSURE: 123 MMHG | DIASTOLIC BLOOD PRESSURE: 85 MMHG

## 2020-03-04 PROCEDURE — 99396 PREV VISIT EST AGE 40-64: CPT

## 2020-03-04 NOTE — HISTORY OF PRESENT ILLNESS
[1 Year Ago] : 1 year ago [Healthy Diet] : a healthy diet [Good] : being in good health [Regular Exercise] : regular exercise [Weight Concerns] : no concerns with her weight [Pap Smear Last Year] : Papanicolaou cytology last year [Unsure Mammogram] : uncertain timing of her last mammogram [Colonoscopy Within 10 Years] : a colonoscopy within the past ten years [Menstrual Problems] : reports normal menses [Up to Date] : up to date with ~his/her~ STD screening [Fever] : no fever [Nausea] : no nausea [Vomiting] : no vomiting [Diarrhea] : no diarrhea [Vaginal Bleeding] : no vaginal bleeding [Pelvic Pressure] : no pelvic pressure [Dysuria] : no dysuria [Sexually Active] : is sexually active

## 2020-03-05 LAB — HPV HIGH+LOW RISK DNA PNL CVX: NOT DETECTED

## 2020-03-09 LAB — CYTOLOGY CVX/VAG DOC THIN PREP: NORMAL

## 2020-03-30 ENCOUNTER — APPOINTMENT (OUTPATIENT)
Dept: ORTHOPEDIC SURGERY | Facility: CLINIC | Age: 60
End: 2020-03-30

## 2020-04-29 ENCOUNTER — APPOINTMENT (OUTPATIENT)
Dept: ORTHOPEDIC SURGERY | Facility: CLINIC | Age: 60
End: 2020-04-29
Payer: COMMERCIAL

## 2020-04-29 PROCEDURE — 99214 OFFICE O/P EST MOD 30 MIN: CPT | Mod: 95

## 2020-04-29 NOTE — PHYSICAL EXAM
[Normal] : Gait: normal [de-identified] : adequate lower extremity motor strength, sensation is intact and symmetrical full range of motion flexion extension and rotation, no palpatory tenderness full range of motion of hips knees shoulders and elbows (all four extremities), no atrophy, negative straight leg raise, no swelling, normal ambulation, no apparent distress skin is intact, no upper or lower extremity instability, alert and oriented x3 and normal mood. Ht 5' 1", 155 lbs.

## 2020-04-29 NOTE — DISCUSSION/SUMMARY
[de-identified] : Low back pain 2 days.\par Discussed all options.\par Voltaren PRN\par stretching exercises\par if no better 2 weeks will call and come to the office\par All questions were answered, all alternatives discussed and the patient is in complete agreement with that plan. Follow-up appointment as instructed. Any issues and the patient will call or come in sooner. \par

## 2020-05-01 ENCOUNTER — APPOINTMENT (OUTPATIENT)
Dept: ORTHOPEDIC SURGERY | Facility: CLINIC | Age: 60
End: 2020-05-01

## 2020-05-14 ENCOUNTER — APPOINTMENT (OUTPATIENT)
Dept: ORTHOPEDIC SURGERY | Facility: CLINIC | Age: 60
End: 2020-05-14
Payer: COMMERCIAL

## 2020-05-14 ENCOUNTER — TRANSCRIPTION ENCOUNTER (OUTPATIENT)
Age: 60
End: 2020-05-14

## 2020-05-14 PROCEDURE — 99214 OFFICE O/P EST MOD 30 MIN: CPT | Mod: 95

## 2020-05-14 RX ORDER — TIZANIDINE 4 MG/1
4 TABLET ORAL EVERY 8 HOURS
Qty: 90 | Refills: 1 | Status: ACTIVE | COMMUNITY
Start: 2020-05-14 | End: 1900-01-01

## 2020-05-14 NOTE — DISCUSSION/SUMMARY
[de-identified] : Low back pain.\par Discussed all options.\par FORCE exercises.\par Tizanidine\par [email or web address]. \par F/U 2 weeks telehealth. \par if no better MRI lumbar\par All questions were answered, all alternatives discussed and the patient is in complete agreement with that plan. Follow-up appointment as instructed. Any issues and the patient will call or come in sooner.

## 2020-05-14 NOTE — HISTORY OF PRESENT ILLNESS
[Other Location: e.g. Home (Enter Location, City,State)___] : at [unfilled] [Home] : at home, [unfilled] , at the time of the visit. [Patient] : the patient [Stable] : stable [de-identified] : Still with LBP.\par Diclofenac mild relief.\par Advil helped.\par 8/10 pain. \par No fever chills sweats nausea vomiting no bowel or bladder dysfunction, no recent weight loss or gain no night pain. This history is in addition to the intake form that I personally reviewed.

## 2020-05-14 NOTE — PHYSICAL EXAM
[Normal] : Gait: normal [SLR] : negative straight leg raise [de-identified] : adequate lower extremity motor strength, sensation is intact and symmetrical full range of motion flexion extension and rotation, no palpatory tenderness full range of motion of hips knees shoulders and elbows (all four extremities), no atrophy, negative straight leg raise, no swelling, normal ambulation, no apparent distress skin is intact, no upper or lower extremity instability, alert and oriented x 3 and normal mood. Adequate heel and toe walk.

## 2020-05-28 ENCOUNTER — APPOINTMENT (OUTPATIENT)
Dept: ORTHOPEDIC SURGERY | Facility: CLINIC | Age: 60
End: 2020-05-28
Payer: COMMERCIAL

## 2020-05-28 PROCEDURE — 99213 OFFICE O/P EST LOW 20 MIN: CPT | Mod: 95

## 2020-05-28 NOTE — PHYSICAL EXAM
[de-identified] : adequate lower extremity motor strength, sensation is intact and symmetrical full range of motion flexion extension and rotation, no palpatory tenderness full range of motion of hips knees shoulders and elbows (all four extremities), no atrophy, negative straight leg raise, no swelling, normal ambulation, no apparent distress skin is intact, no upper or lower extremity instability, alert and oriented x3 and normal mood.

## 2020-05-28 NOTE — HISTORY OF PRESENT ILLNESS
[Improving] : improving [de-identified] : Called to see how her back pain was.\par Tizanidine helped.\par Started HEP.\par ride bike for over 3 miles\par Pain with sitting.\par No fever chills sweats nausea vomiting no bowel or bladder dysfunction, no recent weight loss or gain no night pain. This history is in addition to the intake form that I personally reviewed.

## 2020-05-28 NOTE — DISCUSSION/SUMMARY
[de-identified] : improved low back pain\par discussed options\par continue with bike riding\par continue Tizanidine, NSAIDs PRN\par will F/U 2 weeks\par patient will call for appointment\par discussed MRI will hold off for now\par All questions were answered, all alternatives discussed and the patient is in complete agreement with that plan. Follow-up appointment as instructed. Any issues and the patient will call or come in sooner.

## 2020-06-01 ENCOUNTER — TRANSCRIPTION ENCOUNTER (OUTPATIENT)
Age: 60
End: 2020-06-01

## 2020-07-02 RX ORDER — CYCLOBENZAPRINE HYDROCHLORIDE 5 MG/1
5 TABLET, FILM COATED ORAL 3 TIMES DAILY
Qty: 60 | Refills: 0 | Status: ACTIVE | COMMUNITY
Start: 2020-06-10 | End: 1900-01-01

## 2020-07-28 RX ORDER — CYCLOBENZAPRINE HYDROCHLORIDE 5 MG/1
5 TABLET, FILM COATED ORAL 3 TIMES DAILY
Qty: 60 | Refills: 0 | Status: ACTIVE | COMMUNITY
Start: 2020-07-28 | End: 1900-01-01

## 2020-08-12 ENCOUNTER — FORM ENCOUNTER (OUTPATIENT)
Age: 60
End: 2020-08-12

## 2020-09-03 RX ORDER — CYCLOBENZAPRINE HYDROCHLORIDE 5 MG/1
5 TABLET, FILM COATED ORAL
Qty: 90 | Refills: 1 | Status: ACTIVE | COMMUNITY
Start: 2020-09-03 | End: 1900-01-01

## 2020-09-30 NOTE — H&P PST ADULT - ANESTHESIA, PREVIOUS REACTION, PROFILE
A/P: 89 y/o female PMH MI s/p CABG x 4, HTN, HLD with remote h/o sepsis, bacteremia, ARF 2/2 Left 8.8 midureteral calculus s/p Cysto ureteroscopy with insertion of stent on 8/25/20 with Dr. Leary who was seen in PST yesterday for scheduled Uscope and possible stent removal this Friday, patient developed Nausea/vomiting and was sent to the ER for further evaluation.   Patient admitted with nausea/vomiting, unable to tolerate po intake, ?UTI.   Renal US shows Left ureteral stent and non obstructive L nephrolithiasis.     Continue IV ABX, f/u blood and urine cultures  Medical optimization for OR Friday with Dr. Leary. Patient booked for Ureteroscopy Friday.   Continue medical management and supportive care  NPO p MN Thurs, Preop labs, Covid: neg  Discussed with Dr. Leary    A/P: 89 y/o female PMH MI s/p CABG x 4, HTN, HLD with remote h/o sepsis, bacteremia, ARF 2/2 Left 8.8 midureteral calculus s/p Cysto ureteroscopy with insertion of stent on 8/25/20 with Dr. Leary who was seen in PST yesterday for scheduled Uscope and possible stent removal this Friday, patient developed Nausea/vomiting and was sent to the ER for further evaluation.   Patient admitted with nausea/vomiting, unable to tolerate po intake, ?UTI.   Renal US shows Left ureteral stent and non obstructive L nephrolithiasis.     Continue IV ABX, f/u blood and urine cultures  Medical optimization for OR Friday with Dr. Leary. Patient booked for Ureteroscopy, stent exchange Friday.   Continue medical management and supportive care  NPO p MN Thurs, Preop labs, Covid: neg  Discussed with Dr. Leary    none

## 2020-10-13 ENCOUNTER — APPOINTMENT (OUTPATIENT)
Dept: ORTHOPEDIC SURGERY | Facility: CLINIC | Age: 60
End: 2020-10-13

## 2020-10-29 ENCOUNTER — RX RENEWAL (OUTPATIENT)
Age: 60
End: 2020-10-29

## 2020-10-29 RX ORDER — DICLOFENAC SODIUM 75 MG/1
75 TABLET, DELAYED RELEASE ORAL
Qty: 60 | Refills: 2 | Status: ACTIVE | COMMUNITY
Start: 2020-04-29 | End: 1900-01-01

## 2020-11-12 NOTE — PRE-OP CHECKLIST - SKIN PREP
Department of Internal Medicine  Infectious Diseases  Progress  Note      C/C :  COVID 19 pneumonia     Pt is more awake and alert  Denies fever  Feels cold   Denies SOB   Passed swallow eval       Current Facility-Administered Medications   Medication Dose Route Frequency Provider Last Rate Last Dose    albuterol-ipratropium (COMBIVENT RESPIMAT)  MCG/ACT inhaler 1 puff  1 puff Inhalation Q6H Marikay Meigs, DO   1 puff at 11/12/20 0212    QUEtiapine (SEROQUEL) tablet 25 mg  25 mg Oral BID Robert Fidelia, DO   25 mg at 11/12/20 0804    memantine (NAMENDA) tablet 10 mg  10 mg Oral BID Robert Fidelia, DO   10 mg at 11/12/20 0804    donepezil (ARICEPT) tablet 5 mg  5 mg Oral BID Robert Fidelia, DO   5 mg at 11/12/20 0804    divalproex (DEPAKOTE SPRINKLE) capsule 125 mg  125 mg Oral BID Robert Fidelia, DO   125 mg at 11/12/20 0804    mineral oil-hydrophilic petrolatum (HYDROPHOR) ointment   Topical BID Bhavik Reynolds, DO        And    mineral oil-hydrophilic petrolatum (HYDROPHOR) ointment   Topical TID PRN Robert Fidelia, DO        insulin lispro (HUMALOG) injection vial 0-10 Units  0-10 Units Subcutaneous 4x Daily AC & HS Bhavik Roberts Simjovita, DO   2 Units at 11/09/20 2130    glucose (GLUTOSE) 40 % oral gel 15 g  15 g Oral PRN Bhavik Reynolds, DO        dextrose 50 % IV solution  12.5 g Intravenous PRN Robert Mistry, DO        glucagon (rDNA) injection 1 mg  1 mg Intramuscular PRN Bhavik Roberts Simmers, DO        dextrose 5 % solution  100 mL/hr Intravenous PRN Bhavik Reynolds, DO        miconazole (MICOTIN) 2 % powder   Topical BID Paige Huitron DO        heparin (porcine) injection 5,000 Units  5,000 Units Subcutaneous 3 times per day Basil Ziggy, DO   5,000 Units at 11/12/20 0536    acetaminophen (TYLENOL) tablet 650 mg  650 mg Oral Q6H PRN Jameyil Ziggy, DO        Or    acetaminophen (TYLENOL) suppository 650 mg  650 mg Rectal Q6H PRN Paige BONIFACIO Huitron, DO        guaiFENesin-dextromethorphan (ROBITUSSIN DM) 100-10 MG/5ML syrup 5 mL  5 mL Oral Q4H PRN Conchis Huitron DO   5 mL at 11/08/20 0851    0.9 % sodium chloride bolus  30 mL Intravenous PRN Eric Evans MD        latanoprost (XALATAN) 0.005 % ophthalmic solution 1 drop  1 drop Both Eyes Nightly [place] BONIFACIO Huitron,    1 drop at 11/11/20 2003    bisacodyl (DULCOLAX) suppository 10 mg  10 mg Rectal PRN Vince Mon, DO           PHYSICAL EXAM:      Vitals:     /60   Pulse 104   Temp 97.1 °F (36.2 °C) (Axillary)   Resp 18   Ht 5' 4\" (1.626 m)   Wt 115 lb (52.2 kg)   SpO2 90%   BMI 19.74 kg/m²     General Appearance:     More awake and alert . Head:    Normocephalic, atraumatic   Eyes:    No pallor, no icterus,   Ears:    No obvious deformity or drainage.    Nose:   No nasal drainage   Throat:  Dry oral mucosa    Neck:   Supple, no lymphadenopathy   Lungs:     Diminished breath sound    Heart:     Regular     Abdomen:     Soft, non-tender, bowel sounds present    Extremities:   No edema, no cyanosis   Pulses:   Dorsalis pedis palpable    Skin:   No rash       Lab Results   Component Value Date    WBC 11.7 11/12/2020    RBC 4.64 11/12/2020    HGB 12.6 11/12/2020    HCT 38.2 11/12/2020     11/12/2020    MCV 82.3 11/12/2020    MCH 27.2 11/12/2020    MCHC 33.0 11/12/2020    RDW 15.8 11/12/2020    NRBC 1.0 11/12/2020    SEGSPCT 58 05/06/2013    METASPCT 3.0 11/12/2020    LYMPHOPCT 8.0 11/12/2020    PROMYELOPCT 4.0 11/12/2020    MONOPCT 10.0 11/12/2020    MYELOPCT 1.0 11/12/2020    BASOPCT 0.0 11/12/2020    MONOSABS 1.17 11/12/2020    LYMPHSABS 1.29 11/12/2020    EOSABS 0.12 11/12/2020    BASOSABS 0.00 11/12/2020       CMP     Lab Results   Component Value Date     11/12/2020    K 4.3 11/12/2020    K 3.5 11/03/2020    CL 99 11/12/2020    CO2 26 11/12/2020    BUN 28 11/12/2020    CREATININE 0.4 11/12/2020    GFRAA >60 11/12/2020    LABGLOM >60 11/12/2020    GLUCOSE 74 done

## 2020-11-20 NOTE — DISCUSSION/SUMMARY
[de-identified] : Patient is doing extremely well she does have still discomfort directly over the greater trochanter right and left.  She will try Celebrex 200 mg once a day.  She will contact us if her pain increases.  She will return for routine follow-up in 2 years. Exposure to viral disease

## 2020-12-10 ENCOUNTER — APPOINTMENT (OUTPATIENT)
Dept: OBGYN | Facility: CLINIC | Age: 60
End: 2020-12-10
Payer: COMMERCIAL

## 2020-12-10 VITALS
HEIGHT: 60 IN | SYSTOLIC BLOOD PRESSURE: 156 MMHG | WEIGHT: 170 LBS | BODY MASS INDEX: 33.38 KG/M2 | DIASTOLIC BLOOD PRESSURE: 99 MMHG

## 2020-12-10 DIAGNOSIS — N90.89 OTHER SPECIFIED NONINFLAMMATORY DISORDERS OF VULVA AND PERINEUM: ICD-10-CM

## 2020-12-10 PROCEDURE — 99213 OFFICE O/P EST LOW 20 MIN: CPT

## 2020-12-10 PROCEDURE — 99072 ADDL SUPL MATRL&STAF TM PHE: CPT

## 2020-12-10 RX ORDER — ESTRADIOL 0.1 MG/G
0.1 CREAM VAGINAL
Qty: 1 | Refills: 0 | Status: ACTIVE | COMMUNITY
Start: 2020-12-10 | End: 1900-01-01

## 2020-12-16 RX ORDER — CYCLOBENZAPRINE HYDROCHLORIDE 5 MG/1
5 TABLET, FILM COATED ORAL 3 TIMES DAILY
Qty: 60 | Refills: 0 | Status: ACTIVE | COMMUNITY
Start: 2020-12-16 | End: 1900-01-01

## 2021-02-05 RX ORDER — CYCLOBENZAPRINE HYDROCHLORIDE 5 MG/1
5 TABLET, FILM COATED ORAL 3 TIMES DAILY
Qty: 60 | Refills: 0 | Status: ACTIVE | COMMUNITY
Start: 2021-02-05 | End: 1900-01-01

## 2021-02-05 RX ORDER — DICLOFENAC SODIUM 75 MG/1
75 TABLET, DELAYED RELEASE ORAL
Qty: 60 | Refills: 2 | Status: ACTIVE | COMMUNITY
Start: 2021-02-05 | End: 1900-01-01

## 2021-03-30 RX ORDER — CYCLOBENZAPRINE HYDROCHLORIDE 5 MG/1
5 TABLET, FILM COATED ORAL 3 TIMES DAILY
Qty: 60 | Refills: 0 | Status: ACTIVE | COMMUNITY
Start: 2021-03-30 | End: 1900-01-01

## 2021-04-21 ENCOUNTER — NON-APPOINTMENT (OUTPATIENT)
Age: 61
End: 2021-04-21

## 2021-04-21 ENCOUNTER — APPOINTMENT (OUTPATIENT)
Dept: OBGYN | Facility: CLINIC | Age: 61
End: 2021-04-21
Payer: COMMERCIAL

## 2021-04-21 VITALS
SYSTOLIC BLOOD PRESSURE: 123 MMHG | HEIGHT: 60 IN | DIASTOLIC BLOOD PRESSURE: 81 MMHG | WEIGHT: 170 LBS | BODY MASS INDEX: 33.38 KG/M2

## 2021-04-21 DIAGNOSIS — R92.2 INCONCLUSIVE MAMMOGRAM: ICD-10-CM

## 2021-04-21 PROCEDURE — 99072 ADDL SUPL MATRL&STAF TM PHE: CPT

## 2021-04-21 PROCEDURE — 99396 PREV VISIT EST AGE 40-64: CPT

## 2021-04-21 RX ORDER — NYSTATIN AND TRIAMCINOLONE ACETONIDE 100000; 1 [USP'U]/G; MG/G
100000-0.1 OINTMENT TOPICAL TWICE DAILY
Qty: 1 | Refills: 0 | Status: ACTIVE | COMMUNITY
Start: 2021-04-21 | End: 1900-01-01

## 2021-04-21 NOTE — HISTORY OF PRESENT ILLNESS
[FreeTextEntry1] : pt courtney 59 y/o p2 presents for annual gyn visit with complaint of postcoital clitoral itchiness used estrace but didn't help that much

## 2021-04-23 LAB — HPV HIGH+LOW RISK DNA PNL CVX: NOT DETECTED

## 2021-04-26 LAB — CYTOLOGY CVX/VAG DOC THIN PREP: ABNORMAL

## 2021-05-21 ENCOUNTER — APPOINTMENT (OUTPATIENT)
Dept: ORTHOPEDIC SURGERY | Facility: CLINIC | Age: 61
End: 2021-05-21
Payer: COMMERCIAL

## 2021-05-21 PROCEDURE — 99214 OFFICE O/P EST MOD 30 MIN: CPT

## 2021-05-21 PROCEDURE — 99072 ADDL SUPL MATRL&STAF TM PHE: CPT

## 2021-05-21 RX ORDER — DICLOFENAC SODIUM 75 MG/1
75 TABLET, DELAYED RELEASE ORAL
Qty: 60 | Refills: 2 | Status: ACTIVE | COMMUNITY
Start: 2021-05-21 | End: 1900-01-01

## 2021-05-24 RX ORDER — CYCLOBENZAPRINE HYDROCHLORIDE 5 MG/1
5 TABLET, FILM COATED ORAL 3 TIMES DAILY
Qty: 90 | Refills: 0 | Status: ACTIVE | COMMUNITY
Start: 2021-05-24 | End: 1900-01-01

## 2021-05-27 ENCOUNTER — NON-APPOINTMENT (OUTPATIENT)
Age: 61
End: 2021-05-27

## 2021-06-29 DIAGNOSIS — Z12.39 ENCOUNTER FOR OTHER SCREENING FOR MALIGNANT NEOPLASM OF BREAST: ICD-10-CM

## 2021-06-29 DIAGNOSIS — R92.2 INCONCLUSIVE MAMMOGRAM: ICD-10-CM

## 2021-07-01 ENCOUNTER — NON-APPOINTMENT (OUTPATIENT)
Age: 61
End: 2021-07-01

## 2021-07-06 ENCOUNTER — APPOINTMENT (OUTPATIENT)
Dept: MAMMOGRAPHY | Facility: CLINIC | Age: 61
End: 2021-07-06
Payer: COMMERCIAL

## 2021-07-06 ENCOUNTER — RESULT REVIEW (OUTPATIENT)
Age: 61
End: 2021-07-06

## 2021-07-06 ENCOUNTER — OUTPATIENT (OUTPATIENT)
Dept: OUTPATIENT SERVICES | Facility: HOSPITAL | Age: 61
LOS: 1 days | End: 2021-07-06
Payer: COMMERCIAL

## 2021-07-06 ENCOUNTER — APPOINTMENT (OUTPATIENT)
Dept: ULTRASOUND IMAGING | Facility: CLINIC | Age: 61
End: 2021-07-06
Payer: COMMERCIAL

## 2021-07-06 DIAGNOSIS — G56.01 CARPAL TUNNEL SYNDROME, RIGHT UPPER LIMB: Chronic | ICD-10-CM

## 2021-07-06 DIAGNOSIS — K60.3 ANAL FISTULA: Chronic | ICD-10-CM

## 2021-07-06 DIAGNOSIS — Z12.39 ENCOUNTER FOR OTHER SCREENING FOR MALIGNANT NEOPLASM OF BREAST: ICD-10-CM

## 2021-07-06 DIAGNOSIS — R92.2 INCONCLUSIVE MAMMOGRAM: ICD-10-CM

## 2021-07-06 PROCEDURE — G0279: CPT | Mod: 26

## 2021-07-06 PROCEDURE — 77065 DX MAMMO INCL CAD UNI: CPT | Mod: 26,RT

## 2021-07-06 PROCEDURE — 77065 DX MAMMO INCL CAD UNI: CPT

## 2021-07-06 PROCEDURE — G0279: CPT

## 2021-08-16 RX ORDER — CYCLOBENZAPRINE HYDROCHLORIDE 5 MG/1
5 TABLET, FILM COATED ORAL 3 TIMES DAILY
Qty: 90 | Refills: 0 | Status: ACTIVE | COMMUNITY
Start: 2021-08-16 | End: 1900-01-01

## 2021-11-10 NOTE — H&P PST ADULT - DOCUMENT STATUS
Patient of Dr Nash Aguayo presents to unit for IOL. 40 weeks and 3 days. . Denies LOF, bleeding, cramping or visual changes.    VSS  Placed on TOCO and EFM Authored by Resident/PA/NP

## 2021-11-15 ENCOUNTER — APPOINTMENT (OUTPATIENT)
Dept: MAMMOGRAPHY | Facility: IMAGING CENTER | Age: 61
End: 2021-11-15

## 2021-11-15 ENCOUNTER — APPOINTMENT (OUTPATIENT)
Dept: ULTRASOUND IMAGING | Facility: IMAGING CENTER | Age: 61
End: 2021-11-15

## 2022-01-21 ENCOUNTER — RX RENEWAL (OUTPATIENT)
Age: 62
End: 2022-01-21

## 2022-05-11 ENCOUNTER — APPOINTMENT (OUTPATIENT)
Dept: OBGYN | Facility: CLINIC | Age: 62
End: 2022-05-11
Payer: COMMERCIAL

## 2022-05-11 VITALS
HEIGHT: 60 IN | BODY MASS INDEX: 31.02 KG/M2 | SYSTOLIC BLOOD PRESSURE: 114 MMHG | WEIGHT: 158 LBS | DIASTOLIC BLOOD PRESSURE: 76 MMHG

## 2022-05-11 DIAGNOSIS — Z00.00 ENCOUNTER FOR GENERAL ADULT MEDICAL EXAMINATION W/OUT ABNORMAL FINDINGS: ICD-10-CM

## 2022-05-11 DIAGNOSIS — Z01.419 ENCOUNTER FOR GYNECOLOGICAL EXAMINATION (GENERAL) (ROUTINE) W/OUT ABNORMAL FINDINGS: ICD-10-CM

## 2022-05-11 PROCEDURE — 99396 PREV VISIT EST AGE 40-64: CPT

## 2022-05-13 LAB — HPV HIGH+LOW RISK DNA PNL CVX: NOT DETECTED

## 2022-05-17 LAB — CYTOLOGY CVX/VAG DOC THIN PREP: ABNORMAL

## 2022-07-15 ENCOUNTER — NON-APPOINTMENT (OUTPATIENT)
Age: 62
End: 2022-07-15

## 2022-07-19 ENCOUNTER — TRANSCRIPTION ENCOUNTER (OUTPATIENT)
Age: 62
End: 2022-07-19

## 2022-07-20 ENCOUNTER — TRANSCRIPTION ENCOUNTER (OUTPATIENT)
Age: 62
End: 2022-07-20

## 2022-07-20 RX ORDER — CONJUGATED ESTROGENS 0.62 MG/G
0.62 CREAM VAGINAL
Qty: 1 | Refills: 0 | Status: ACTIVE | COMMUNITY
Start: 2022-07-20 | End: 1900-01-01

## 2022-08-03 ENCOUNTER — APPOINTMENT (OUTPATIENT)
Dept: ULTRASOUND IMAGING | Facility: CLINIC | Age: 62
End: 2022-08-03

## 2022-08-03 ENCOUNTER — APPOINTMENT (OUTPATIENT)
Dept: RADIOLOGY | Facility: CLINIC | Age: 62
End: 2022-08-03

## 2022-08-03 ENCOUNTER — RESULT REVIEW (OUTPATIENT)
Age: 62
End: 2022-08-03

## 2022-08-03 ENCOUNTER — APPOINTMENT (OUTPATIENT)
Dept: MAMMOGRAPHY | Facility: CLINIC | Age: 62
End: 2022-08-03

## 2022-08-03 ENCOUNTER — OUTPATIENT (OUTPATIENT)
Dept: OUTPATIENT SERVICES | Facility: HOSPITAL | Age: 62
LOS: 1 days | End: 2022-08-03
Payer: COMMERCIAL

## 2022-08-03 DIAGNOSIS — Z00.8 ENCOUNTER FOR OTHER GENERAL EXAMINATION: ICD-10-CM

## 2022-08-03 DIAGNOSIS — G56.01 CARPAL TUNNEL SYNDROME, RIGHT UPPER LIMB: Chronic | ICD-10-CM

## 2022-08-03 DIAGNOSIS — K60.3 ANAL FISTULA: Chronic | ICD-10-CM

## 2022-08-03 PROCEDURE — 77066 DX MAMMO INCL CAD BI: CPT | Mod: 26

## 2022-08-03 PROCEDURE — 77080 DXA BONE DENSITY AXIAL: CPT

## 2022-08-03 PROCEDURE — G0279: CPT | Mod: 26

## 2022-08-03 PROCEDURE — 77080 DXA BONE DENSITY AXIAL: CPT | Mod: 26

## 2022-08-03 PROCEDURE — 76641 ULTRASOUND BREAST COMPLETE: CPT

## 2022-08-03 PROCEDURE — G0279: CPT

## 2022-08-03 PROCEDURE — 77066 DX MAMMO INCL CAD BI: CPT

## 2022-08-03 PROCEDURE — 76641 ULTRASOUND BREAST COMPLETE: CPT | Mod: 26,50

## 2022-08-04 ENCOUNTER — NON-APPOINTMENT (OUTPATIENT)
Age: 62
End: 2022-08-04

## 2022-08-04 RX ORDER — ESTRADIOL 0.1 MG/G
0.1 CREAM VAGINAL
Qty: 1 | Refills: 0 | Status: ACTIVE | COMMUNITY
Start: 2022-08-04 | End: 1900-01-01

## 2022-08-31 ENCOUNTER — RX RENEWAL (OUTPATIENT)
Age: 62
End: 2022-08-31

## 2022-09-08 ENCOUNTER — APPOINTMENT (OUTPATIENT)
Dept: ORTHOPEDIC SURGERY | Facility: CLINIC | Age: 62
End: 2022-09-08

## 2022-09-08 VITALS — HEIGHT: 60 IN | BODY MASS INDEX: 29.64 KG/M2 | WEIGHT: 151 LBS

## 2022-09-08 PROCEDURE — 99213 OFFICE O/P EST LOW 20 MIN: CPT

## 2022-09-20 RX ORDER — CYCLOBENZAPRINE HYDROCHLORIDE 10 MG/1
10 TABLET, FILM COATED ORAL 3 TIMES DAILY
Qty: 90 | Refills: 0 | Status: DISCONTINUED | COMMUNITY
Start: 2021-05-21 | End: 2022-09-20

## 2022-09-20 RX ORDER — CYCLOBENZAPRINE HYDROCHLORIDE 10 MG/1
10 TABLET, FILM COATED ORAL 3 TIMES DAILY
Qty: 30 | Refills: 0 | Status: ACTIVE | COMMUNITY
Start: 2022-09-20 | End: 1900-01-01

## 2022-09-20 RX ORDER — OXYCODONE 10 MG/1
10 TABLET ORAL
Qty: 10 | Refills: 0 | Status: ACTIVE | COMMUNITY
Start: 2022-09-20 | End: 1900-01-01

## 2022-09-29 NOTE — HISTORY OF PRESENT ILLNESS
Called patient and spoke with her over the phone. Discussed findings of chronic inflammation on gastric biopsies. This is likely related to her uncontrolled symptoms on pantoprazole. She currently has \"much better\" control on omeprazole per her report. She is to continue to take omeprazole daily. This can be continued long term. If she is interested in discontinuing in the future, appointment can be made to discuss further. She verbalized understanding and agreement with plan. No questions at this time.    [Home] : at home, [unfilled] , at the time of the visit. [Other Location: e.g. Home (Enter Location, City,State)___] : at [unfilled] [Patient] : the patient [Stable] : stable [de-identified] : Low back pain and worse for 2 days.\par Takes Tramadol.\par Left JERRICA and bursitis.\par Dr. Malone patient. \par History reviewed in [employer].\par No leg pain.\par LBP in middle.\par No xrays.\par No fever chills sweats nausea vomiting no bowel or bladder dysfunction, no recent weight loss or gain no night pain. This history is in addition to the intake form that I personally reviewed. \par

## 2022-12-08 ENCOUNTER — RX RENEWAL (OUTPATIENT)
Age: 62
End: 2022-12-08

## 2022-12-08 RX ORDER — DICLOFENAC SODIUM 75 MG/1
75 TABLET, DELAYED RELEASE ORAL
Qty: 60 | Refills: 1 | Status: ACTIVE | COMMUNITY
Start: 2022-09-08 | End: 1900-01-01

## 2023-01-13 NOTE — PHYSICAL THERAPY INITIAL EVALUATION ADULT - PRECAUTIONS/LIMITATIONS, REHAB EVAL
NEURO CRITICAL CARE  PROGRESS NOTE    Patient: Zafar Nichols Date: 2023   : 1960   Attending: Mendel Klein MD         Admission date: 2023        SUBJECTIVE       24hr Significant Events: MRI completed with multiple ring enhancing lesions in the L MCA territory, c/f subacute stroke vs abscess      History of Presenting Illness    Zafar Nichols is a 61 yo man with PMH of HTN, DM, HLD, and migraine HA who presented to OSH at 1500 with complaint of severe HA and dizziness that started this morning at 0800. Pt's wife also noted slurred speech and word finding difficulties. Telestroke neurology was consulted who found an initial NIHSS of 0. CTH without acute ischemia. CTA with distal M1 and proximal M2 occlusion with good collateral flow. Patient did not meet criteria for TNK or endovascular therapy. He was given full dose ASA and was recommended to transfer to stroke center for further care.     He now presents to the [facility] neurologically intact with minor word finding difficulties. Stat MRI has been ordered to further evaluate the effects of the occlusion. Denies any symptoms and feels back to baseline. No current complaints. Does not smoke or drink alcohol. NKA. Targeting SBP goal < 180 and will continue full dose ASA.          ALLERGIES:   Allergen Reactions   • Dust Mite Extract Cough       Current Facility-Administered Medications   Medication Dose Route Frequency Provider Last Rate Last Admin   • insulin lispro (ADMELOG,HumaLOG) - Correction Dose   Subcutaneous Nightly Miguel Monroy PA-C       • insulin lispro (ADMELOG,HumaLOG) - Correction Dose   Subcutaneous TID WC Miguel Monroy PA-C   12 Units at 23 7068   • magnesium oxide (MAG-OX) tablet 400 mg  400 mg Oral Once Mendel Klein MD       • labetalol (NORMODYNE) injection 20 mg  20 mg Intravenous Q1H PRN Mendel Klein MD   20 mg at 23 3979   • hydrALAZINE (APRESOLINE) injection 10 mg  10 mg Intravenous Q4H PRN Mendel  ADA Klein MD   10 mg at 01/12/23 2020   • dextrose 50 % injection 25 g  25 g Intravenous PRN Mendel Klein MD       • dextrose 50 % injection 12.5 g  12.5 g Intravenous PRN Mendel Klein MD       • glucagon (GLUCAGEN) injection 1 mg  1 mg Intramuscular PRN Mendel Klein MD       • dextrose (GLUTOSE) 40 % gel 15 g  15 g Oral PRN Mendel Klein MD       • dextrose (GLUTOSE) 40 % gel 30 g  30 g Oral PRN Mendel Klein MD       • aspirin tablet 325 mg  325 mg Oral Daily Miguel Monroy PA-C       • acetaminophen (TYLENOL) tablet 650 mg  650 mg Oral Q4H PRN Miguel Monroy PA-C   650 mg at 01/12/23 2019    Or   • acetaminophen (TYLENOL) suppository 650 mg  650 mg Rectal Q4H PRN Miguel Monroy PA-C       • sodium chloride (PF) 0.9 % injection 2 mL  2 mL Intracatheter 2 times per day Miguel Monroy PA-C   2 mL at 01/12/23 2108   • sodium chloride 0.9 % flush bag 25 mL  25 mL Intravenous PRN Miguel Monroy PA-C       • atorvastatin (LIPITOR) tablet 80 mg  80 mg Oral Nightly Migule Monroy PA-C   80 mg at 01/12/23 2107   • ondansetron (ZOFRAN ODT) disintegrating tablet 4 mg  4 mg Oral Q12H PRN Miguel Monroy PA-C   4 mg at 01/13/23 0313    Or   • ondansetron (ZOFRAN) injection 4 mg  4 mg Intravenous Q12H PRN Miguel Monroy PA-C       • docusate sodium (COLACE) capsule 200 mg  200 mg Oral Daily Miguel Monroy PA-C   200 mg at 01/13/23 0800   • polyethylene glycol (MIRALAX) packet 17 g  17 g Oral Daily PRN Miguel Monroy PA-C       • Potassium Standard Replacement Protocol (Levels 3.5 and lower)   Does not apply See Admin Instructions Miguel Monroy PA-C       • Potassium Replacement (Levels 3.6 - 4)   Does not apply See Admin Instructions Miguel Monroy PA-C       • Magnesium Standard Replacement Protocol   Does not apply See Admin Instructions Miguel Monroy PA-C       • Phosphorus Standard Replacement Protocol   Does not apply See Admin Instructions Miguel OSCAR Monroy PA-C       • lisinopril (ZESTRIL)  tablet 20 mg  20 mg Oral Daily Miguel Monroy PA-C   20 mg at 01/13/23 0800   • butalbital-APAP-caffeine (FIORICET) -40 MG tablet 1 tablet  1 tablet Oral Q4H PRN Miguel Monroy PA-C   1 tablet at 01/13/23 0758   • HYDROcodone-acetaminophen (NORCO) 5-325 MG per tablet 1 tablet  1 tablet Oral Q4H PRN Miguel Monroy PA-C   1 tablet at 01/13/23 0616           OBJECTIVE   VITAL SIGNS:     Vital Last Value 24 Hour Range   Temperature 97.8 °F (36.6 °C) (01/13/23 0400) Temp  Min: 97.8 °F (36.6 °C)  Max: 98 °F (36.7 °C)   Pulse 63 (01/13/23 0700) Pulse  Min: 53  Max: 70   Respiratory 15 (01/13/23 0700) Resp  Min: 14  Max: 20   Non-Invasive  Blood Pressure (!) 153/92 (01/13/23 0700) BP  Min: 136/79  Max: 190/99   Pulse Oximetry 94 % (01/13/23 0700) SpO2  Min: 94 %  Max: 99 %     INTAKE/OUTPUT:    Intake/Output Summary (Last 24 hours) at 1/13/2023 0801  Last data filed at 1/13/2023 0259  Gross per 24 hour   Intake 848.23 ml   Output 700 ml   Net 148.23 ml       Physical Exam:  GENERAL APPEARANCE: The patient is a 62-year-old well-developed, well-nourished male in no acute distress.  HEENT: Normocephalic, atraumatic. PERRL.  Oropharynx is clear. Mouth revealed good dentition, no lesions.   NECK:  Trachea is midline. No evidence of thyroid enlargement.   LUNGS: Breath sounds are equal and clear bilaterally. No wheezes, rhonchi, or rales.  HEART: Regular rate and rhythm with normal S1 and S2. No murmurs, gallops, or rubs.  ABDOMEN: Soft. No tenderness, guarding, or rebound.  Bowel sounds are present.  EXTREMITIES: No cyanosis, clubbing, or edema.  PSYCHIATRIC:  The patient is awake, alert.  Appropriate mood and affect.  SKIN: Warm, dry, and well perfused.  No lesions, nodules or rashes noted.      NEUROLOGIC:      Orientation:  A&O x 4    Cranial nerves: PERRL.  Visual fields full.  EOMI.  Facial sensation intact to light touch throughout.  Face symmetric with normal movement bilaterally.  Tongue midline with normal  movements.    Motor:   RUE: 5/5  LUE: 5/5  RLE: 5/5  LLE: 5/5    Sensation: Intact to light touch bilaterally  Cerebellar exam: No nystagmus, no ataxia, FTN normal  Reflexes:  Not tested  Gait: not assessed        LABORATORY DATA:  Last 24 hour labs were reviewed in detail.  Recent Labs   Lab 01/13/23 0444 01/12/23  1904   SODIUM 139 140   POTASSIUM 3.6 3.9   CHLORIDE 108 108   CO2 25 25   ANIONGAP 10 11   BUN 16 16   CREATININE 1.36* 1.49*   CALCIUM 9.2 9.2   GLUCOSE 237* 184*     Recent Labs   Lab 01/12/23  1904   ALBUMIN 3.0*   AST 19     Recent Labs   Lab 01/13/23 0444 01/12/23  1904   MG 1.6*  --    PHOS 3.4 3.0     Recent Labs   Lab 01/13/23 0444 01/12/23  1904   HGB 12.7* 13.0   HCT 37.9* 38.3*    156   WBC 7.0 5.5       Diagnostic studies:  Imaging personally reviewed    LAST MRI:  === 01/12/23 ===    MRA NECK W WO CONTRAST    - Narrative -  Exam: MRI brain with and without contrast.  MRA head without contrast.  MRA  neck with and without contrast.    CLINICAL HISTORY: Headache and dizziness.    TECHNIQUE: Multiphase, multiplanar MRI imaging the brain was performed  without contrast and following the intravenous injection of 9 mL of  Gadavist contrast.    3-D time-of-flight imaging was utilized for the MRA head without contrast.    3-D time-of-flight imaging of the neck was performed without contrast and  following the intravenous administration of gadolinium contrast.    Multiple 3-D reformats of the intracranial and neck arterial vascular tree  was performed by the MRI technologist.    COMPARISON: None.    FINDINGS:    MRI BRAIN:    There is a rim-enhancing enhancing intra-axial lesion in the left  parietal-occipital region measuring 5 mm (image 107, series 14).  There is  a questionable associated subtle area of diffusion restriction (image 19,  series 3C)    There are multifocal areas of nodular enhancement in the superior aspect of  the left superior temporal lobe along the inferior margin of  the sylvian  fissure (image 79-94, series 14).  The largest area of abnormal enhancement  measures up to approximately 1 cm (image 93, series 14).  Areas of  enhancement appears to be present in the cortical gray matter or  extra-axial/subarachnoid space/leptomeningeal.  There is questionable  subtle increased diffusion signal.    There is a rim-enhancing lesion in the posterior left parietal lobe  measuring 6 mm (image 88, series 14).  There appears to be an associated  area of diffusion restriction.    There is a small focus of low signal intensity on the susceptibility/GRE  weighted images in the left cerebellar hemisphere measuring 6 mm (image 20,  series 9) concerning for hemosiderin deposition from old hemorrhage.  Acute  hemorrhage is not excluded.  Noncontrast head CT could be performed for  further evaluation.    Scattered foci of increased T2 and FLAIR signal throughout the white  matter, sequelae of chronic small vessel ischemia and demyelinating disease  such as multiple sclerosis is included in the differential diagnosis.    MRA HEAD:    Mildly limited exam due to motion.  No large branch occlusion of the  anterior or middle cerebral arteries.  No large branch occlusion of the  posterior cerebral arteries.  No definite high flow vascular malformation  or sizable aneurysm.    MRA NECK: Common carotid arteries are patent.    No high-grade internal carotid artery stenosis is evident.    Vertebral arteries are mildly tortuous but appear patent proximally.    No flow seen within the distal left vertebral artery just proximal to the  basilar artery concerning for occlusion/high-grade stenosis.  Artifact is a  possibility as well.    - Impression -  5 mm rim-enhancing intra-axial focus in the left parietal-occipital region  with questionable associated mild diffusion restriction.  Subacute infarct,  metastatic lesion and small abscess is included in the differential  diagnosis.    Multifocal areas of nodular  enhancement along the superior aspect of the  temporal lobe metastasis/malignancy is a possibility.  The lesions appear  to be within the cortical gray matter and possibly in the subarachnoid  space/leptomeningeal.  Metastatic lesions are a possibility.  There is  questionable subtle associated increased diffusion signal.  This could  indicate an infectious process such as meningitis or cerebritis.  Active  demyelination process is a possibility.  Subacute ischemia is also a  possibility.    6 mm rim-enhancing intra-axial focus within the left parietal region.  Infarct, small abscess/cerebritis and metastatic lesion is included in the  differential diagnosis.  Scattered foci of increased T2 and FLAIR within  the white matter sequelae of chronic small vessel ischemia and  demyelination such as multiple sclerosis is included in the differential  diagnosis.    Evidence of a small focus of hemorrhage in the left cerebellar hemisphere.  This probably is hemosiderin deposition from chronic hemorrhage.  Unenhanced head CT could be performed to exclude a small acute  intraparenchymal hematoma.    Limited MRA head shows no intracranial large branch vessel occlusion.    High-grade stenosis or occlusion of the left distal vertebral artery.  Normal flow seen within the basilar artery and posterior cerebral arteries.    No hemodynamically significant carotid artery stenosis.    Note: Findings were discussed with nurse practitionerMiguel at 4:18 AM on  01/13/2023 by Dr. Somers via telephone.    Electronically Signed by: AMIE SOMERS M.D.  Signed on: 1/13/2023 4:19 AM    ___________________________________________________________________________    MRA HEAD WO CONTRAST    - Narrative -  Exam: MRI brain with and without contrast.  MRA head without contrast.  MRA  neck with and without contrast.    CLINICAL HISTORY: Headache and dizziness.    TECHNIQUE: Multiphase, multiplanar MRI imaging the brain was performed  without  fall precautions/surgical precautions/left hip precautions/posterior contrast and following the intravenous injection of 9 mL of  Gadavist contrast.    3-D time-of-flight imaging was utilized for the MRA head without contrast.    3-D time-of-flight imaging of the neck was performed without contrast and  following the intravenous administration of gadolinium contrast.    Multiple 3-D reformats of the intracranial and neck arterial vascular tree  was performed by the MRI technologist.    COMPARISON: None.    FINDINGS:    MRI BRAIN:    There is a rim-enhancing enhancing intra-axial lesion in the left  parietal-occipital region measuring 5 mm (image 107, series 14).  There is  a questionable associated subtle area of diffusion restriction (image 19,  series 3C)    There are multifocal areas of nodular enhancement in the superior aspect of  the left superior temporal lobe along the inferior margin of the sylvian  fissure (image 79-94, series 14).  The largest area of abnormal enhancement  measures up to approximately 1 cm (image 93, series 14).  Areas of  enhancement appears to be present in the cortical gray matter or  extra-axial/subarachnoid space/leptomeningeal.  There is questionable  subtle increased diffusion signal.    There is a rim-enhancing lesion in the posterior left parietal lobe  measuring 6 mm (image 88, series 14).  There appears to be an associated  area of diffusion restriction.    There is a small focus of low signal intensity on the susceptibility/GRE  weighted images in the left cerebellar hemisphere measuring 6 mm (image 20,  series 9) concerning for hemosiderin deposition from old hemorrhage.  Acute  hemorrhage is not excluded.  Noncontrast head CT could be performed for  further evaluation.    Scattered foci of increased T2 and FLAIR signal throughout the white  matter, sequelae of chronic small vessel ischemia and demyelinating disease  such as multiple sclerosis is included in the differential diagnosis.    MRA HEAD:    Mildly limited exam due to motion.  No  large branch occlusion of the  anterior or middle cerebral arteries.  No large branch occlusion of the  posterior cerebral arteries.  No definite high flow vascular malformation  or sizable aneurysm.    MRA NECK: Common carotid arteries are patent.    No high-grade internal carotid artery stenosis is evident.    Vertebral arteries are mildly tortuous but appear patent proximally.    No flow seen within the distal left vertebral artery just proximal to the  basilar artery concerning for occlusion/high-grade stenosis.  Artifact is a  possibility as well.    - Impression -  5 mm rim-enhancing intra-axial focus in the left parietal-occipital region  with questionable associated mild diffusion restriction.  Subacute infarct,  metastatic lesion and small abscess is included in the differential  diagnosis.    Multifocal areas of nodular enhancement along the superior aspect of the  temporal lobe metastasis/malignancy is a possibility.  The lesions appear  to be within the cortical gray matter and possibly in the subarachnoid  space/leptomeningeal.  Metastatic lesions are a possibility.  There is  questionable subtle associated increased diffusion signal.  This could  indicate an infectious process such as meningitis or cerebritis.  Active  demyelination process is a possibility.  Subacute ischemia is also a  possibility.    6 mm rim-enhancing intra-axial focus within the left parietal region.  Infarct, small abscess/cerebritis and metastatic lesion is included in the  differential diagnosis.  Scattered foci of increased T2 and FLAIR within  the white matter sequelae of chronic small vessel ischemia and  demyelination such as multiple sclerosis is included in the differential  diagnosis.    Evidence of a small focus of hemorrhage in the left cerebellar hemisphere.  This probably is hemosiderin deposition from chronic hemorrhage.  Unenhanced head CT could be performed to exclude a small acute  intraparenchymal  hematoma.    Limited MRA head shows no intracranial large branch vessel occlusion.    High-grade stenosis or occlusion of the left distal vertebral artery.  Normal flow seen within the basilar artery and posterior cerebral arteries.    No hemodynamically significant carotid artery stenosis.    Note: Findings were discussed with nurse practitionerMiguel at 4:18 AM on  01/13/2023 by Dr. Somers via telephone.    Electronically Signed by: AMIE SOMERS M.D.  Signed on: 1/13/2023 4:19 AM    ___________________________________________________________________________    MRI BRAIN W WO CONTRAST    - Narrative -  Exam: MRI brain with and without contrast.  MRA head without contrast.  MRA  neck with and without contrast.    CLINICAL HISTORY: Headache and dizziness.    TECHNIQUE: Multiphase, multiplanar MRI imaging the brain was performed  without contrast and following the intravenous injection of 9 mL of  Gadavist contrast.    3-D time-of-flight imaging was utilized for the MRA head without contrast.    3-D time-of-flight imaging of the neck was performed without contrast and  following the intravenous administration of gadolinium contrast.    Multiple 3-D reformats of the intracranial and neck arterial vascular tree  was performed by the MRI technologist.    COMPARISON: None.    FINDINGS:    MRI BRAIN:    There is a rim-enhancing enhancing intra-axial lesion in the left  parietal-occipital region measuring 5 mm (image 107, series 14).  There is  a questionable associated subtle area of diffusion restriction (image 19,  series 3C)    There are multifocal areas of nodular enhancement in the superior aspect of  the left superior temporal lobe along the inferior margin of the sylvian  fissure (image 79-94, series 14).  The largest area of abnormal enhancement  measures up to approximately 1 cm (image 93, series 14).  Areas of  enhancement appears to be present in the cortical gray matter or  extra-axial/subarachnoid  space/leptomeningeal.  There is questionable  subtle increased diffusion signal.    There is a rim-enhancing lesion in the posterior left parietal lobe  measuring 6 mm (image 88, series 14).  There appears to be an associated  area of diffusion restriction.    There is a small focus of low signal intensity on the susceptibility/GRE  weighted images in the left cerebellar hemisphere measuring 6 mm (image 20,  series 9) concerning for hemosiderin deposition from old hemorrhage.  Acute  hemorrhage is not excluded.  Noncontrast head CT could be performed for  further evaluation.    Scattered foci of increased T2 and FLAIR signal throughout the white  matter, sequelae of chronic small vessel ischemia and demyelinating disease  such as multiple sclerosis is included in the differential diagnosis.    MRA HEAD:    Mildly limited exam due to motion.  No large branch occlusion of the  anterior or middle cerebral arteries.  No large branch occlusion of the  posterior cerebral arteries.  No definite high flow vascular malformation  or sizable aneurysm.    MRA NECK: Common carotid arteries are patent.    No high-grade internal carotid artery stenosis is evident.    Vertebral arteries are mildly tortuous but appear patent proximally.    No flow seen within the distal left vertebral artery just proximal to the  basilar artery concerning for occlusion/high-grade stenosis.  Artifact is a  possibility as well.    - Impression -  5 mm rim-enhancing intra-axial focus in the left parietal-occipital region  with questionable associated mild diffusion restriction.  Subacute infarct,  metastatic lesion and small abscess is included in the differential  diagnosis.    Multifocal areas of nodular enhancement along the superior aspect of the  temporal lobe metastasis/malignancy is a possibility.  The lesions appear  to be within the cortical gray matter and possibly in the subarachnoid  space/leptomeningeal.  Metastatic lesions are a  possibility.  There is  questionable subtle associated increased diffusion signal.  This could  indicate an infectious process such as meningitis or cerebritis.  Active  demyelination process is a possibility.  Subacute ischemia is also a  possibility.    6 mm rim-enhancing intra-axial focus within the left parietal region.  Infarct, small abscess/cerebritis and metastatic lesion is included in the  differential diagnosis.  Scattered foci of increased T2 and FLAIR within  the white matter sequelae of chronic small vessel ischemia and  demyelination such as multiple sclerosis is included in the differential  diagnosis.    Evidence of a small focus of hemorrhage in the left cerebellar hemisphere.  This probably is hemosiderin deposition from chronic hemorrhage.  Unenhanced head CT could be performed to exclude a small acute  intraparenchymal hematoma.    Limited MRA head shows no intracranial large branch vessel occlusion.    High-grade stenosis or occlusion of the left distal vertebral artery.  Normal flow seen within the basilar artery and posterior cerebral arteries.    No hemodynamically significant carotid artery stenosis.    Note: Findings were discussed with nurse practitionerMiguel at 4:18 AM on  01/13/2023 by Dr. Somers via telephone.    Electronically Signed by: AMIE SOMERS M.D.  Signed on: 1/13/2023 4:19 AM        ASSESSMENT/PLAN   62 year old male with PMH of HTN, DM, HLD, and migraine HA here with severe HA and dizziness. Found to have mutlifocal regions of enhancement all in the L MCA territory.         Neuro:   severe HA and dizziness  L parietal lesions - subacute stroke vs abscesses vs metastatic lesions  - Will need LP  - Hold ASA this AM for LP and then resume  -  - cont statin  - A1c 10.5, accuchecks and insulin  - TSH WNL  - MRI/A as above  - Neuro checks Q2H  - TTE with bubble pending  - PT/OT  - CT Chest/abd/pelvis to assess for malignancy vs infectious source    Pulmonary:    No active issues   - Saturating well on RA    CV:   HTN  HLD  - Cont lisinopril  - SBP goal <160, labetalol, hydralazine PRN  - , cont satin    Renal:   JACQUELINE - unknown baseline Cr, improving  - Will give fluids prior to next dye load  - Monitor UOP  - BMP, Mg, Phos QD  - Maintain K > 4, Phos > 3, and Mg > 2     GI:   No active issues     Heme:   Cerebral lesions as above  - work up as above  - CBC QD    Endocrine:   DM  - Hold home metformin  - On 32 U of lantus at home  - A1c 10.5  - Start lantus 16U now  - HD SSI    ID:   Cerebral lesions as above  - Work up as above  - Monitor for fevers/leukocytosis  - Procalcitonin pending        BEST PRACTICES:  ICU Checklist:  Feeding: diabetic diet  Analgesia: APAP, fioricet  Sedation: none  Thromboprophylaxis: SCDs  Ulcer prophylaxis: not indicated  Glucose: accuchecks, insulin  SBT: on RA  Bowels: PTA  Indwelling lines: PIV x2  Code status: FULL  Dispo: Transfer to floor      I have spent greater than 45 minutes reviewing patient's diagnostic studies, examining patient, reviewing pertinent consultant's notes, collaborating with RNs/physicians/APNs/PAs involved in patient's care, determining management plan, and discussing plan with patient and/or family. Patient is critcally ill as documented above and requires high complexity decision-making and active titration of therapies to preserve life.          Johanna Alvares,    1/13/2023 8:01 AM

## 2023-02-03 ENCOUNTER — APPOINTMENT (OUTPATIENT)
Dept: ORTHOPEDIC SURGERY | Facility: CLINIC | Age: 63
End: 2023-02-03
Payer: COMMERCIAL

## 2023-02-03 DIAGNOSIS — M51.36 OTHER INTERVERTEBRAL DISC DEGENERATION, LUMBAR REGION: ICD-10-CM

## 2023-02-03 PROCEDURE — 99213 OFFICE O/P EST LOW 20 MIN: CPT | Mod: 95

## 2023-02-03 RX ORDER — CYCLOBENZAPRINE HYDROCHLORIDE 10 MG/1
10 TABLET, FILM COATED ORAL 3 TIMES DAILY
Qty: 90 | Refills: 1 | Status: ACTIVE | COMMUNITY
Start: 2023-02-03 | End: 1900-01-01

## 2023-02-20 ENCOUNTER — TRANSCRIPTION ENCOUNTER (OUTPATIENT)
Age: 63
End: 2023-02-20

## 2023-02-20 RX ORDER — TIZANIDINE 4 MG/1
4 TABLET ORAL
Qty: 60 | Refills: 1 | Status: ACTIVE | COMMUNITY
Start: 2023-02-20 | End: 1900-01-01

## 2023-03-06 ENCOUNTER — TRANSCRIPTION ENCOUNTER (OUTPATIENT)
Age: 63
End: 2023-03-06

## 2023-05-25 ENCOUNTER — RX RENEWAL (OUTPATIENT)
Age: 63
End: 2023-05-25

## 2023-05-25 ENCOUNTER — NON-APPOINTMENT (OUTPATIENT)
Age: 63
End: 2023-05-25

## 2023-05-31 ENCOUNTER — APPOINTMENT (OUTPATIENT)
Dept: OBGYN | Facility: CLINIC | Age: 63
End: 2023-05-31
Payer: COMMERCIAL

## 2023-05-31 VITALS
HEIGHT: 60 IN | SYSTOLIC BLOOD PRESSURE: 124 MMHG | WEIGHT: 155 LBS | BODY MASS INDEX: 30.43 KG/M2 | DIASTOLIC BLOOD PRESSURE: 80 MMHG

## 2023-05-31 DIAGNOSIS — R14.0 ABDOMINAL DISTENSION (GASEOUS): ICD-10-CM

## 2023-05-31 DIAGNOSIS — Z01.419 ENCOUNTER FOR GYNECOLOGICAL EXAMINATION (GENERAL) (ROUTINE) W/OUT ABNORMAL FINDINGS: ICD-10-CM

## 2023-05-31 PROCEDURE — 99396 PREV VISIT EST AGE 40-64: CPT

## 2023-05-31 NOTE — HISTORY OF PRESENT ILLNESS
[FreeTextEntry1] : pt is a 63 y/o p2 presents for annual gyn visit with complaint of clitoral itching  recently  in january c/o of bloating for the past few months

## 2023-06-01 ENCOUNTER — ASOB RESULT (OUTPATIENT)
Age: 63
End: 2023-06-01

## 2023-06-01 ENCOUNTER — APPOINTMENT (OUTPATIENT)
Dept: OBGYN | Facility: CLINIC | Age: 63
End: 2023-06-01
Payer: COMMERCIAL

## 2023-06-01 LAB — HPV HIGH+LOW RISK DNA PNL CVX: NOT DETECTED

## 2023-06-01 PROCEDURE — 76830 TRANSVAGINAL US NON-OB: CPT

## 2023-06-05 LAB — CYTOLOGY CVX/VAG DOC THIN PREP: ABNORMAL

## 2023-07-10 ENCOUNTER — RX RENEWAL (OUTPATIENT)
Age: 63
End: 2023-07-10

## 2023-07-10 RX ORDER — DICLOFENAC SODIUM 75 MG/1
75 TABLET, DELAYED RELEASE ORAL
Qty: 60 | Refills: 1 | Status: ACTIVE | COMMUNITY
Start: 2023-02-03 | End: 1900-01-01

## 2023-09-15 ASSESSMENT — HOOS JR
HOOS JR RAW SCORE: 17
WALKING ON UNEVEN SURFACE: SEVERE
IMPORTED LATERALITY: LEFT
SITTING: SEVERE
LYING IN BED (TURNING OVER, MAINTAINING HIP POSITION): EXTREME
RISING FROM SITTING: MILD
IMPORTED FORM: YES
BENDING TO THE FLOOR TO PICK UP OBJECT: SEVERE
GOING UP OR DOWN STAIRS: SEVERE
IMPORTED HOOS JR SCORE: 36.36

## 2023-09-18 ENCOUNTER — RX RENEWAL (OUTPATIENT)
Age: 63
End: 2023-09-18

## 2023-09-19 ENCOUNTER — RX RENEWAL (OUTPATIENT)
Age: 63
End: 2023-09-19

## 2023-09-19 RX ORDER — NYSTATIN AND TRIAMCINOLONE ACETONIDE 100000; 1 MG/G; MG/G
100000-0.1 CREAM TOPICAL TWICE DAILY
Qty: 30 | Refills: 0 | Status: ACTIVE | COMMUNITY
Start: 2023-06-09 | End: 1900-01-01

## 2023-10-04 ENCOUNTER — APPOINTMENT (OUTPATIENT)
Dept: ORTHOPEDIC SURGERY | Facility: CLINIC | Age: 63
End: 2023-10-04
Payer: COMMERCIAL

## 2023-10-04 ENCOUNTER — NON-APPOINTMENT (OUTPATIENT)
Age: 63
End: 2023-10-04

## 2023-10-04 DIAGNOSIS — Z96.642 AFTERCARE FOLLOWING JOINT REPLACEMENT SURGERY: ICD-10-CM

## 2023-10-04 DIAGNOSIS — M70.62 TROCHANTERIC BURSITIS, LEFT HIP: ICD-10-CM

## 2023-10-04 DIAGNOSIS — M54.50 LOW BACK PAIN, UNSPECIFIED: ICD-10-CM

## 2023-10-04 DIAGNOSIS — M25.561 PAIN IN RIGHT KNEE: ICD-10-CM

## 2023-10-04 DIAGNOSIS — M70.61 TROCHANTERIC BURSITIS, RIGHT HIP: ICD-10-CM

## 2023-10-04 DIAGNOSIS — Z47.1 AFTERCARE FOLLOWING JOINT REPLACEMENT SURGERY: ICD-10-CM

## 2023-10-04 DIAGNOSIS — M51.37 OTHER INTERVERTEBRAL DISC DEGENERATION, LUMBOSACRAL REGION: ICD-10-CM

## 2023-10-04 PROCEDURE — 20610 DRAIN/INJ JOINT/BURSA W/O US: CPT | Mod: RT

## 2023-10-04 PROCEDURE — 73522 X-RAY EXAM HIPS BI 3-4 VIEWS: CPT

## 2023-10-04 PROCEDURE — 99214 OFFICE O/P EST MOD 30 MIN: CPT | Mod: 25

## 2023-10-04 PROCEDURE — 73564 X-RAY EXAM KNEE 4 OR MORE: CPT | Mod: RT

## 2023-10-04 PROCEDURE — 72100 X-RAY EXAM L-S SPINE 2/3 VWS: CPT

## 2023-10-04 RX ORDER — CYCLOBENZAPRINE HYDROCHLORIDE 5 MG/1
5 TABLET, FILM COATED ORAL
Qty: 60 | Refills: 0 | Status: ACTIVE | COMMUNITY
Start: 2023-10-04 | End: 1900-01-01

## 2023-10-04 RX ORDER — DICLOFENAC SODIUM 75 MG/1
75 TABLET, DELAYED RELEASE ORAL TWICE DAILY
Qty: 60 | Refills: 0 | Status: ACTIVE | COMMUNITY
Start: 2023-10-04 | End: 1900-01-01

## 2024-01-22 RX ORDER — CYCLOBENZAPRINE HYDROCHLORIDE 5 MG/1
5 TABLET, FILM COATED ORAL
Qty: 60 | Refills: 0 | Status: ACTIVE | COMMUNITY
Start: 2024-01-22 | End: 1900-01-01

## 2024-05-07 RX ORDER — CYCLOBENZAPRINE HYDROCHLORIDE 5 MG/1
5 TABLET, FILM COATED ORAL
Qty: 90 | Refills: 0 | Status: ACTIVE | COMMUNITY
Start: 2022-09-08 | End: 1900-01-01

## 2024-05-21 ENCOUNTER — APPOINTMENT (OUTPATIENT)
Dept: ORTHOPEDIC SURGERY | Facility: CLINIC | Age: 64
End: 2024-05-21

## 2024-06-07 ENCOUNTER — TRANSCRIPTION ENCOUNTER (OUTPATIENT)
Age: 64
End: 2024-06-07

## 2024-06-21 NOTE — PRE-OP CHECKLIST - VERIFY SURGICAL SITE/SIDE WITH PATIENT
left/done [Menstruating] : menstruating [Definite ___ (Date)] : the last menstrual period was [unfilled] [Regular Cycle Intervals] : have been regular [Total Preg ___] : G[unfilled] [Live Births ___] : P[unfilled]  [Full Term ___] : Full Term: [unfilled] [Premature ___] : Premature: [unfilled] [Abortions ___] : Abortions:[unfilled] [Living ___] : Living: [unfilled] [AB Spont ___] : miscarriages: [unfilled]

## 2024-07-25 ENCOUNTER — APPOINTMENT (OUTPATIENT)
Dept: ORTHOPEDIC SURGERY | Facility: CLINIC | Age: 64
End: 2024-07-25

## 2024-07-29 ENCOUNTER — APPOINTMENT (OUTPATIENT)
Dept: OBGYN | Facility: CLINIC | Age: 64
End: 2024-07-29
Payer: COMMERCIAL

## 2024-07-29 VITALS
WEIGHT: 155 LBS | SYSTOLIC BLOOD PRESSURE: 132 MMHG | DIASTOLIC BLOOD PRESSURE: 81 MMHG | HEIGHT: 60 IN | BODY MASS INDEX: 30.43 KG/M2

## 2024-07-29 DIAGNOSIS — Z01.419 ENCOUNTER FOR GYNECOLOGICAL EXAMINATION (GENERAL) (ROUTINE) W/OUT ABNORMAL FINDINGS: ICD-10-CM

## 2024-07-29 DIAGNOSIS — N94.9 UNSPECIFIED CONDITION ASSOCIATED WITH FEMALE GENITAL ORGANS AND MENSTRUAL CYCLE: ICD-10-CM

## 2024-07-29 DIAGNOSIS — N95.2 POSTMENOPAUSAL ATROPHIC VAGINITIS: ICD-10-CM

## 2024-07-29 PROCEDURE — 99396 PREV VISIT EST AGE 40-64: CPT

## 2024-07-29 RX ORDER — ESTRADIOL 0.1 MG/G
0.1 CREAM VAGINAL
Qty: 1 | Refills: 1 | Status: ACTIVE | COMMUNITY
Start: 2024-07-29 | End: 1900-01-01

## 2024-07-29 NOTE — HISTORY OF PRESENT ILLNESS
[FreeTextEntry1] : pt presents for annual gyn visit c/o lesion on right labia for years not increasing in size sometimes itchy, also vaginal dryness

## 2024-07-31 LAB — HPV HIGH+LOW RISK DNA PNL CVX: NOT DETECTED

## 2024-08-02 LAB
HHV SPEC CULT: NORMAL
HSV TYPE 1: NORMAL
HSV TYPE 2: NORMAL

## 2024-08-05 ENCOUNTER — APPOINTMENT (OUTPATIENT)
Dept: ORTHOPEDIC SURGERY | Facility: CLINIC | Age: 64
End: 2024-08-05

## 2024-08-05 LAB — CYTOLOGY CVX/VAG DOC THIN PREP: ABNORMAL

## 2024-08-12 ENCOUNTER — APPOINTMENT (OUTPATIENT)
Dept: ORTHOPEDIC SURGERY | Facility: CLINIC | Age: 64
End: 2024-08-12
Payer: COMMERCIAL

## 2024-08-12 VITALS — HEIGHT: 60 IN | BODY MASS INDEX: 30.43 KG/M2 | WEIGHT: 155 LBS

## 2024-08-12 DIAGNOSIS — M51.36 OTHER INTERVERTEBRAL DISC DEGENERATION, LUMBAR REGION: ICD-10-CM

## 2024-08-12 DIAGNOSIS — M50.30 OTHER CERVICAL DISC DEGENERATION, UNSPECIFIED CERVICAL REGION: ICD-10-CM

## 2024-08-12 PROCEDURE — 72040 X-RAY EXAM NECK SPINE 2-3 VW: CPT

## 2024-08-12 PROCEDURE — 99214 OFFICE O/P EST MOD 30 MIN: CPT

## 2024-08-12 PROCEDURE — 72100 X-RAY EXAM L-S SPINE 2/3 VWS: CPT

## 2024-08-12 RX ORDER — CYCLOBENZAPRINE HYDROCHLORIDE 10 MG/1
10 TABLET, FILM COATED ORAL 3 TIMES DAILY
Qty: 30 | Refills: 1 | Status: ACTIVE | COMMUNITY
Start: 2024-08-12 | End: 1900-01-01

## 2024-08-12 NOTE — HISTORY OF PRESENT ILLNESS
[Stable] : stable [de-identified] : 64 year old female with lumbar DDD presents for evaluation of neck pain x 1 year and chronic lower back pain.  Last seen in Feb 2023. Has previously been giving B/L GT injections. She states she has limited ROM of the neck. She denies radiation of pain down the arms and legs. Denies numbness/tingling.  She states she has pain at the left anterior tibial region x 6 months.  Takes diclofenac and flexeril and has temporary relief.  Has not tried PT or chiropractic care. Denies GILDARDO.  Has tried acupuncture 1 year ago but did not feel improvement.  PMHx:  left THR 2017 with Dr. Malone, HTN, HLD No fever chills sweats nausea vomiting no bowel or bladder dysfunction, no recent weight loss or gain no night pain. This history is in addition to the intake form that I personally reviewed.

## 2024-08-12 NOTE — PHYSICAL EXAM
[Normal] : Gait: normal [Pronator Drift] : negative pronator drift [SLR] : negative straight leg raise [Trendelenburg's Test] : negative Trendelenburg's test [de-identified] : 5 out of 5 motor strength, sensation is intact and symmetrical full range of motion flexion extension and rotation, no palpatory tenderness full range of motion of hips knees shoulders and elbows (all four extremities), no atrophy, negative straight leg raise, no pathological reflexes, no swelling, normal ambulation, no apparent distress skin is intact, no palpable lymph nodes, no upper or lower extremity instability, alert and oriented x3 and normal mood. Normal finger-to nose test.  [de-identified] : XR AP Lat Cervical 08/12/2024 -Cervical degenerative disc disease- reviewed with patient.    XR AP Lat Lumbar 08/12/2024 -Left JERRICA, mild scoliosis, lumbar disc degenerative disease- reviewed with patient.

## 2024-08-12 NOTE — DISCUSSION/SUMMARY
[de-identified] : Lumbar degenerative disc disease. Mild lumbar scoliosis. Cervical degenerative disc disease. Discussed all options. Diclofenac PRN. Cyclobenzaprine PRN. Referral for physical therapy. All options discussed including rest, medicine, home exercise, acupuncture, Chiropractic care, Physical Therapy, Pain management, and last resort surgery. All questions were answered, all alternatives discussed, and the patient is in complete agreement with the treatment plan which the patient contributed to and discussed with me through the shared decision-making process. Follow-up appointment as instructed. Any issues and the patient will call or come in sooner. Family member agrees with plan.

## 2024-08-12 NOTE — ADDENDUM
[FreeTextEntry1] : This note was written by Mo Tellez on 08/12/2024 acting as scribe for Dr. Fady Denny M.D.  I, Fady Denny MD, have read and attest that all the information, medical decision making and discharge instructions within are true and accurate.

## 2024-08-19 ENCOUNTER — TRANSCRIPTION ENCOUNTER (OUTPATIENT)
Age: 64
End: 2024-08-19

## 2024-08-19 RX ORDER — CYCLOBENZAPRINE HYDROCHLORIDE 10 MG/1
10 TABLET, FILM COATED ORAL 3 TIMES DAILY
Qty: 90 | Refills: 2 | Status: ACTIVE | COMMUNITY
Start: 2024-08-19 | End: 1900-01-01

## 2024-08-20 ENCOUNTER — APPOINTMENT (OUTPATIENT)
Dept: ORTHOPEDIC SURGERY | Facility: CLINIC | Age: 64
End: 2024-08-20

## 2024-08-20 RX ORDER — DICLOFENAC SODIUM 75 MG/1
75 TABLET, DELAYED RELEASE ORAL
Qty: 180 | Refills: 1 | Status: ACTIVE | COMMUNITY
Start: 2024-08-12 | End: 1900-01-01

## 2024-09-03 ENCOUNTER — APPOINTMENT (OUTPATIENT)
Dept: MAMMOGRAPHY | Facility: CLINIC | Age: 64
End: 2024-09-03

## 2024-09-03 ENCOUNTER — APPOINTMENT (OUTPATIENT)
Dept: ULTRASOUND IMAGING | Facility: CLINIC | Age: 64
End: 2024-09-03

## 2024-10-08 ENCOUNTER — TRANSCRIPTION ENCOUNTER (OUTPATIENT)
Age: 64
End: 2024-10-08

## 2025-03-19 ENCOUNTER — TRANSCRIPTION ENCOUNTER (OUTPATIENT)
Age: 65
End: 2025-03-19

## 2025-07-29 ENCOUNTER — NON-APPOINTMENT (OUTPATIENT)
Age: 65
End: 2025-07-29

## 2025-07-31 ENCOUNTER — APPOINTMENT (OUTPATIENT)
Dept: OBGYN | Facility: CLINIC | Age: 65
End: 2025-07-31
Payer: COMMERCIAL

## 2025-07-31 VITALS
SYSTOLIC BLOOD PRESSURE: 138 MMHG | HEART RATE: 89 BPM | WEIGHT: 148 LBS | BODY MASS INDEX: 29.06 KG/M2 | DIASTOLIC BLOOD PRESSURE: 88 MMHG | HEIGHT: 60 IN

## 2025-07-31 DIAGNOSIS — Z01.419 ENCOUNTER FOR GYNECOLOGICAL EXAMINATION (GENERAL) (ROUTINE) W/OUT ABNORMAL FINDINGS: ICD-10-CM

## 2025-07-31 PROCEDURE — 99396 PREV VISIT EST AGE 40-64: CPT

## 2025-08-04 LAB — HPV HIGH+LOW RISK DNA PNL CVX: NOT DETECTED

## 2025-08-11 LAB — CYTOLOGY CVX/VAG DOC THIN PREP: ABNORMAL
